# Patient Record
Sex: MALE | Race: WHITE | Employment: FULL TIME | URBAN - METROPOLITAN AREA
[De-identification: names, ages, dates, MRNs, and addresses within clinical notes are randomized per-mention and may not be internally consistent; named-entity substitution may affect disease eponyms.]

---

## 2017-03-09 ENCOUNTER — HOSPITAL ENCOUNTER (OUTPATIENT)
Dept: BEHAVIORAL HEALTH | Facility: CLINIC | Age: 37
Discharge: HOME | End: 2017-03-09
Attending: SOCIAL WORKER | Admitting: SOCIAL WORKER
Payer: COMMERCIAL

## 2017-03-09 VITALS
HEART RATE: 80 BPM | DIASTOLIC BLOOD PRESSURE: 70 MMHG | WEIGHT: 141.4 LBS | BODY MASS INDEX: 22.19 KG/M2 | SYSTOLIC BLOOD PRESSURE: 115 MMHG | HEIGHT: 67 IN

## 2017-03-09 PROCEDURE — H0001 ALCOHOL AND/OR DRUG ASSESS: HCPCS

## 2017-03-09 ASSESSMENT — ANXIETY QUESTIONNAIRES
6. BECOMING EASILY ANNOYED OR IRRITABLE: SEVERAL DAYS
2. NOT BEING ABLE TO STOP OR CONTROL WORRYING: SEVERAL DAYS
7. FEELING AFRAID AS IF SOMETHING AWFUL MIGHT HAPPEN: SEVERAL DAYS
GAD7 TOTAL SCORE: 7
4. TROUBLE RELAXING: SEVERAL DAYS
5. BEING SO RESTLESS THAT IT IS HARD TO SIT STILL: SEVERAL DAYS
3. WORRYING TOO MUCH ABOUT DIFFERENT THINGS: SEVERAL DAYS
1. FEELING NERVOUS, ANXIOUS, OR ON EDGE: SEVERAL DAYS

## 2017-03-09 ASSESSMENT — PAIN SCALES - GENERAL: PAINLEVEL: NO PAIN (0)
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
## 2017-03-10 ASSESSMENT — ANXIETY QUESTIONNAIRES: GAD7 TOTAL SCORE: 7

## 2017-03-10 ASSESSMENT — PATIENT HEALTH QUESTIONNAIRE - PHQ9: SUM OF ALL RESPONSES TO PHQ QUESTIONS 1-9: 11

## 2017-03-10 NOTE — PROGRESS NOTES
[facility]  [address] [place], [place] [address]., [place] [address]      3/10/2017      Nitin Derrick Baker  [address], [address]  [facility] [address]      Dear Mr. Baker,    It was a pleasure meeting with you on 3/9/2017 for your Chemical Dependency Evaluation. Based on your evaluation, the recommendation is:  1)  Complete a residential based or similar treatment program, such as [facility].   2)  Abstain from all mood-altering chemicals unless prescribed by a licensed provider.   3)  Attend, at minimum, 2 weekly 12-step support group meetings.     4)  Actively work with a male sponsor on a weekly basis.   5)  Follow all the recommendations of your treatment/medical providers.  6)  Remain law abiding and follow all recommendations of the Courts/PO.  7)  To come off percocet, seek out either detox or a suboxone provider.    To recap what we discussed briefly on the phone today.  A)  To find a suboxone provider, please contact your insurance, [facility] [place] [facility] ([phone]), or [facility] ([phone]).  B)  To learn more about FMLA, please contact your HR department.  C)  [facility] will most likely contact you, if they do not, please contact them at [phone] and ask for Evelyn.    If I can be of further service, please don't hesitate to call.    Sincerely,        Kassie Teresa MA [facility]  CD Evaluation Counselor  [phone]    (emailed to pt at his request on 3/10/2017)
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
## 2017-03-30 ENCOUNTER — HOSPITAL ENCOUNTER (INPATIENT)
Facility: CLINIC | Age: 37
LOS: 4 days | Discharge: HOME | DRG: 897 | End: 2017-04-03
Attending: FAMILY MEDICINE | Admitting: PSYCHIATRY & NEUROLOGY
Payer: COMMERCIAL

## 2017-03-30 ENCOUNTER — TELEPHONE (OUTPATIENT)
Dept: BEHAVIORAL HEALTH | Facility: CLINIC | Age: 37
End: 2017-03-30

## 2017-03-30 DIAGNOSIS — F11.20 UNCOMPLICATED OPIOID DEPENDENCE (H): ICD-10-CM

## 2017-03-30 PROBLEM — F11.93 OPIOID WITHDRAWAL (H): Status: ACTIVE | Noted: 2017-03-30

## 2017-03-30 LAB
ALBUMIN SERPL-MCNC: 3.7 G/DL (ref 3.4–5)
ALP SERPL-CCNC: 86 U/L (ref 40–150)
ALT SERPL W P-5'-P-CCNC: 15 U/L (ref 0–70)
AMPHETAMINES UR QL SCN: NORMAL
ANION GAP SERPL CALCULATED.3IONS-SCNC: 9 MMOL/L (ref 3–14)
APAP SERPL-MCNC: NORMAL MG/L (ref 10–20)
AST SERPL W P-5'-P-CCNC: 14 U/L (ref 0–45)
BARBITURATES UR QL: NORMAL
BASOPHILS # BLD AUTO: 0 10E9/L (ref 0–0.2)
BASOPHILS NFR BLD AUTO: 0.4 %
BENZODIAZ UR QL: NORMAL
BILIRUB SERPL-MCNC: 0.5 MG/DL (ref 0.2–1.3)
BUN SERPL-MCNC: 13 MG/DL (ref 7–30)
CALCIUM SERPL-MCNC: 8.6 MG/DL (ref 8.5–10.1)
CANNABINOIDS UR QL SCN: NORMAL
CHLORIDE SERPL-SCNC: 108 MMOL/L (ref 94–109)
CO2 SERPL-SCNC: 25 MMOL/L (ref 20–32)
COCAINE UR QL: NORMAL
CREAT SERPL-MCNC: 0.7 MG/DL (ref 0.66–1.25)
DIFFERENTIAL METHOD BLD: NORMAL
EOSINOPHIL # BLD AUTO: 0.1 10E9/L (ref 0–0.7)
EOSINOPHIL NFR BLD AUTO: 0.7 %
ERYTHROCYTE [DISTWIDTH] IN BLOOD BY AUTOMATED COUNT: 12.4 % (ref 10–15)
ETHANOL UR QL SCN: NORMAL
GFR SERPL CREATININE-BSD FRML MDRD: ABNORMAL ML/MIN/1.7M2
GLUCOSE SERPL-MCNC: 147 MG/DL (ref 70–99)
HCT VFR BLD AUTO: 42.4 % (ref 40–53)
HGB BLD-MCNC: 14.3 G/DL (ref 13.3–17.7)
IMM GRANULOCYTES # BLD: 0 10E9/L (ref 0–0.4)
IMM GRANULOCYTES NFR BLD: 0.1 %
LYMPHOCYTES # BLD AUTO: 1.4 10E9/L (ref 0.8–5.3)
LYMPHOCYTES NFR BLD AUTO: 19.9 %
MCH RBC QN AUTO: 29.9 PG (ref 26.5–33)
MCHC RBC AUTO-ENTMCNC: 33.7 G/DL (ref 31.5–36.5)
MCV RBC AUTO: 89 FL (ref 78–100)
MONOCYTES # BLD AUTO: 0.3 10E9/L (ref 0–1.3)
MONOCYTES NFR BLD AUTO: 4.5 %
NEUTROPHILS # BLD AUTO: 5.2 10E9/L (ref 1.6–8.3)
NEUTROPHILS NFR BLD AUTO: 74.4 %
NRBC # BLD AUTO: 0 10*3/UL
NRBC BLD AUTO-RTO: 0 /100
OPIATES UR QL SCN: NORMAL
PLATELET # BLD AUTO: 209 10E9/L (ref 150–450)
POTASSIUM SERPL-SCNC: 3.4 MMOL/L (ref 3.4–5.3)
PROT SERPL-MCNC: 7 G/DL (ref 6.8–8.8)
RBC # BLD AUTO: 4.78 10E12/L (ref 4.4–5.9)
SODIUM SERPL-SCNC: 142 MMOL/L (ref 133–144)
WBC # BLD AUTO: 6.9 10E9/L (ref 4–11)

## 2017-03-30 PROCEDURE — 99283 EMERGENCY DEPT VISIT LOW MDM: CPT | Performed by: FAMILY MEDICINE

## 2017-03-30 PROCEDURE — 25000132 ZZH RX MED GY IP 250 OP 250 PS 637: Performed by: NURSE PRACTITIONER

## 2017-03-30 PROCEDURE — 80329 ANALGESICS NON-OPIOID 1 OR 2: CPT | Performed by: FAMILY MEDICINE

## 2017-03-30 PROCEDURE — 85025 COMPLETE CBC W/AUTO DIFF WBC: CPT | Performed by: FAMILY MEDICINE

## 2017-03-30 PROCEDURE — HZ2ZZZZ DETOXIFICATION SERVICES FOR SUBSTANCE ABUSE TREATMENT: ICD-10-PCS | Performed by: PSYCHIATRY & NEUROLOGY

## 2017-03-30 PROCEDURE — 80320 DRUG SCREEN QUANTALCOHOLS: CPT | Performed by: FAMILY MEDICINE

## 2017-03-30 PROCEDURE — 80053 COMPREHEN METABOLIC PANEL: CPT | Performed by: FAMILY MEDICINE

## 2017-03-30 PROCEDURE — 99284 EMERGENCY DEPT VISIT MOD MDM: CPT | Mod: Z6 | Performed by: FAMILY MEDICINE

## 2017-03-30 PROCEDURE — 80307 DRUG TEST PRSMV CHEM ANLYZR: CPT | Performed by: FAMILY MEDICINE

## 2017-03-30 PROCEDURE — 12800008 ZZH R&B CD ADULT

## 2017-03-30 RX ORDER — ONDANSETRON 4 MG/1
4 TABLET, ORALLY DISINTEGRATING ORAL EVERY 6 HOURS PRN
Status: DISCONTINUED | OUTPATIENT
Start: 2017-03-30 | End: 2017-04-03 | Stop reason: HOSPADM

## 2017-03-30 RX ORDER — HYDROXYZINE HYDROCHLORIDE 25 MG/1
25-50 TABLET, FILM COATED ORAL EVERY 4 HOURS PRN
Status: DISCONTINUED | OUTPATIENT
Start: 2017-03-30 | End: 2017-04-03 | Stop reason: HOSPADM

## 2017-03-30 RX ORDER — IBUPROFEN 600 MG/1
600 TABLET, FILM COATED ORAL
Status: ON HOLD | COMMUNITY
Start: 2016-12-18 | End: 2017-04-03

## 2017-03-30 RX ORDER — BISACODYL 10 MG
10 SUPPOSITORY, RECTAL RECTAL DAILY PRN
Status: DISCONTINUED | OUTPATIENT
Start: 2017-03-30 | End: 2017-04-03 | Stop reason: HOSPADM

## 2017-03-30 RX ORDER — ALUMINA, MAGNESIA, AND SIMETHICONE 2400; 2400; 240 MG/30ML; MG/30ML; MG/30ML
30 SUSPENSION ORAL EVERY 4 HOURS PRN
Status: DISCONTINUED | OUTPATIENT
Start: 2017-03-30 | End: 2017-04-03 | Stop reason: HOSPADM

## 2017-03-30 RX ORDER — BUPRENORPHINE 2 MG/1
4 TABLET SUBLINGUAL
Status: DISCONTINUED | OUTPATIENT
Start: 2017-03-30 | End: 2017-03-31 | Stop reason: ALTCHOICE

## 2017-03-30 RX ORDER — LOPERAMIDE HCL 2 MG
2 CAPSULE ORAL 4 TIMES DAILY PRN
Status: DISCONTINUED | OUTPATIENT
Start: 2017-03-30 | End: 2017-04-03 | Stop reason: HOSPADM

## 2017-03-30 RX ORDER — HYDROXYZINE HYDROCHLORIDE 25 MG/1
25-50 TABLET, FILM COATED ORAL EVERY 4 HOURS PRN
Status: DISCONTINUED | OUTPATIENT
Start: 2017-03-30 | End: 2017-03-31

## 2017-03-30 RX ORDER — IBUPROFEN 600 MG/1
600 TABLET, FILM COATED ORAL EVERY 6 HOURS PRN
Status: DISCONTINUED | OUTPATIENT
Start: 2017-03-30 | End: 2017-04-03 | Stop reason: HOSPADM

## 2017-03-30 RX ORDER — TRAZODONE HYDROCHLORIDE 50 MG/1
50 TABLET, FILM COATED ORAL
Status: DISCONTINUED | OUTPATIENT
Start: 2017-03-30 | End: 2017-04-03 | Stop reason: HOSPADM

## 2017-03-30 RX ADMIN — TRAZODONE HYDROCHLORIDE 50 MG: 50 TABLET ORAL at 22:59

## 2017-03-30 RX ADMIN — ALUMINUM HYDROXIDE, MAGNESIUM HYDROXIDE, AND DIMETHICONE 30 ML: 400; 400; 40 SUSPENSION ORAL at 23:00

## 2017-03-30 ASSESSMENT — ACTIVITIES OF DAILY LIVING (ADL)
ORAL_HYGIENE: INDEPENDENT
GROOMING: INDEPENDENT
DRESS: INDEPENDENT

## 2017-03-30 NOTE — IP AVS SNAPSHOT
MRN:[id number]                      After Visit Summary   3/30/2017    Nitin Baker    MRN: [id number]           Thank you!     Thank you for choosing [facility] for your care. Our goal is always to provide you with excellent care.        Patient Information     Date Of Birth          1980        About your hospital stay     You were admitted on:  March 30, 2017 You last received care in the:  [facility]    You were discharged on:  April 3, 2017       Who to Call     For medical emergencies, please call 911.  For non-urgent questions about your medical care, please call your primary care provider or clinic, None          Attending Provider     Provider Specialty    Chacho Stevens MD [facility]    Renaldo, Tanya Blanchard MD Pediatrics    Miladis, Jesus Lugo MD [facility]       Primary Care Provider    Physician No Ref-Primary       No address on file        Further instructions from your care team       Behavioral Discharge Planning and Instructions  THANK YOU FOR CHOOSING THE [address]  [phone]    Summary: You were admitted to [facility] on 3/30/17 for detoxification from Opiate. A medical exam was performed that included lab work. You have met with a  and opted to discharge home awaiting admission to [facility] or [facility]. Please call the [facility] and/or [facility] Admissions Specialist each morning at [phone] and [phone] to check on admission status.  Please take care and make your recovery a priority.    [facility]  [address] [place] [address] [phone]    [address] [address] [phone]    Main Diagnosis:  Per Dr. Macario/Miladis  Opioid dependence with withdrawal   Active Problems:  Opioid use disorder, severe, dependence    History of traumatic brain injury     Major  Treatments, Procedures and Findings:  You received treatment for Opiate withdrawal.  You have met with a  to develop a treatment plan for discharge.  You have had labs drawn and a copy of those labs will be sent home with you.  Please bring your lab results with you to your primary follow up appointment. Make your recovery a priority!    Symptoms to Report:  If you experience more anxiety, confusion, sleeplessness, deep sadness or thoughts of suicide, notify your treatment team or notify your primary care physician. IF ANY OF THE SYMPTOMS YOU ARE EXPERIENCING ARE A MEDICAL EMERGENCY CALL 911 IMMEDIATELY.     Lifestyle Adjustment: Adjust your lifestyle to get enough sleep, relaxation, exercise and  good nutrition. Continue to develop healthy coping skills to decrease stress and promote a sober living environment. Do not use alcohol, illegal drugs or addictive medications other than what is currently prescribed. JUANJOSE GALLEGOS, and  Sponsor are excellent resources for support.     Primary Provider:   [facility]  [address] [place] [address]  # [phone]    Appointments:  5/5/17 at 11:00am     Resources:     [facility] [phone]  Support Group:  AA/NA and Sponsor/support  Crisis Intervention: 246.940.9548 or 295-783-7627 (TTY: 815.223.1704).  Call anytime for help.  National [place] on Mental Illness (www.[place].katy.org): 230.852.4036 or 610-604-1250.  Alcoholics Anonymous (www.alcoholics-anonymous.org): Check your phone book for your local chapter.  Suicide Awareness Voices of Education (SAVE) (www.save.org): 642-085-VONW (1675)  National Suicide Prevention Line (www.mentalhealthmn.org): 925-930-QFIF (1785)  Mental Health Consumer/Survivor Network of [place] (www.mhcsn.net): 721.596.4445 or 883-168-6670  Mental Health Association of [place] (www.mentalhealth.org): 638.413.6106 or 225-745-0135   Substance Abuse and Mental Health Services (www.samhsa.gov)  Narcotics  Anonymous [place]: www.[facility].org  [place] Help line  0-898-232-9018   [facility] ([facility])  [facility] connects people seeking recovery to resources that help foster and sustain long-term recovery.  Whether you are seeking resources for treatment, transportation, housing, job training, education, health care or other pathways to recovery, [facility] is a great place to start.  604.917.2531.  Www.[facility].org      -There has been an increase in opioid overdose and deaths recently.  After even a short period of no drug use a person's intolerance level is lowered.  It is not safe to think a person can use the same amount of drugs as they did prior to their period of no drug use.   Returning to your drug using environment and contact with your drug using friends puts you at high risk for relapse.    Www.harmreduction.org        General Medication Instructions:   See your medication sheet(s) for instructions.   Take all medicines as directed.  Make no changes unless your doctor suggests them.   Go to all your doctor visits.  Be sure to have all your required lab tests. This way, your medicines can be refilled on time.  Do not use any drugs not prescribed by your provider.  AA/NA and Sponsors are excellent resources for support  Avoid alcohol.      Please Note:  If you have any questions at anytime after you are discharged please call the [facility] detox unit 3AW unit at [phone].    [facility], Behavioral Intake [phone]    Please take this discharge folder with you to all your follow up appointments, it contains your lab results, diagnosis, medication list and discharge recommendations.      THANK YOU FOR CHOOSING THE [facility]     Pending Results     No orders found from 3/28/2017 to 3/31/2017.            Statement of Approval     Ordered          04/03/17 0937  I have reviewed and agree with  "all the recommendations and orders detailed in this document.  EFFECTIVE NOW     Approved and electronically signed by:  Jesus Redding MD           17 0935  I have reviewed and agree with all the recommendations and orders detailed in this document.  EFFECTIVE NOW     Approved and electronically signed by:  Jesus Redding MD             Admission Information     Date & Time Provider Department Dept. Phone    3/30/2017 Jesus Redding MD [facility] [phone]      Your Vitals Were     Blood Pressure Pulse Temperature Respirations Height Weight    107/70 (BP Location: Left arm) 57 97.3  F (36.3  C) (Oral) 16 1.702 m (5' 7\") 62.6 kg (138 lb)    Pulse Oximetry BMI (Body Mass Index)                97% 21.61 kg/m2          MyChart Information     [facility] lets you send messages to your doctor, view your test results, renew your prescriptions, schedule appointments and more. To sign up, go to [email or web address] . Click on \"Log in\" on the left side of the screen, which will take you to the Welcome page. Then click on \"Sign up Now\" on the right side of the page.     You will be asked to enter the access code listed below, as well as some personal information. Please follow the directions to create your username and password.     Your access code is: [email or web address]  Expires: 2017 12:18 PM     Your access code will  in 90 days. If you need help or a new code, please call your [place] clinic or [phone].        Care EveryWhere ID     This is your Care EveryWhere ID. This could be used by other organizations to access your [place] medical records  [id number]           Review of your medicines      START taking        Dose / Directions    gabapentin 300 MG capsule   Commonly known as:  NEURONTIN   Used for:  Uncomplicated opioid dependence (H)        Dose:  300 mg   Take 1 capsule (300 mg) by mouth 3 times daily   Quantity:  90 capsule   Refills:  0       " traZODone 50 MG tablet   Commonly known as:  DESYREL   Used for:  Uncomplicated opioid dependence (H)        Dose:  50 mg   Take 1 tablet (50 mg) by mouth nightly as needed for sleep   Quantity:  30 tablet   Refills:  0         STOP taking     ibuprofen 600 MG tablet   Commonly known as:  ADVIL/MOTRIN                Where to get your medicines      These medications were sent to [facility] [place], [place] - [address]  [address] [facility], [facility] [address]     Phone:  [phone]     gabapentin 300 MG capsule    traZODone 50 MG tablet                Protect others around you: Learn how to safely use, store and throw away your medicines at www.disposemymeds.org.             Medication List: This is a list of all your medications and when to take them. Check marks below indicate your daily home schedule. Keep this list as a reference.      Medications           Morning Afternoon Evening Bedtime As Needed    gabapentin 300 MG capsule   Commonly known as:  NEURONTIN   Take 1 capsule (300 mg) by mouth 3 times daily   Last time this was given:  300 mg on 4/3/2017  8:44 AM                                         traZODone 50 MG tablet   Commonly known as:  DESYREL   Take 1 tablet (50 mg) by mouth nightly as needed for sleep   Last time this was given:  50 mg on 4/2/2017  9:37 PM

## 2017-03-30 NOTE — PROGRESS NOTES
03/30/17 1658   Patient Belongings   Did you bring any home meds/supplements to the hospital?  Yes   Disposition of meds  Sent to security/pharmacy per site process   Disposition of Belongings storage   Belongings Search Yes   Clothing Search Yes   Second Staff Davin       STORAGE BIN:   coat, cap, shoes, sweatshirt, belt, keys, cigarettes, lighters, nail clippers, bag of papers    LOCKED DRAWER 320-2:   cell phone, wallet    SECURITY:   $100.00, 2 [place] id, 2 visa, ins., loose meds, pill cutters.    ADMISSION:  I am responsible for any personal items that are not sent to the safe or pharmacy. [facility] is not responsible for loss, theft or damage of any property in my possession.  Patient Signature _____________________ Date/Time _____________________  Staff Signature _______________________ Date/Time _____________________  2nd Staff person, if patient is unable/unwilling to sign  ___________________________________ Date/Time _____________________  DISCHARGE:  All personal items have been returned to me.  Patient Signature _____________________ Date/Time _____________________  Staff Signature _______________________ Date/Time _____________________

## 2017-03-30 NOTE — PHARMACY-ADMISSION MEDICATION HISTORY
Admission Medication History status for the 3/30/2017 admission is complete.  See EPIC admission navigator for Prior to Admission medications.    Medication history interview sources:  Patient     Medication history source reliability: Good    Patient reports taking no prescription or over-the-counter medications.    Time spent in this activity: 5 minutes    Medication history completed by: Wendy King PharmD    Prior to Admission medications    Not on File

## 2017-03-30 NOTE — IP AVS SNAPSHOT
[facility]    [address] [place] [address]    Phone:  [phone]                                       After Visit Summary   3/30/2017    Nitin Baker    MRN: [id number]           After Visit Summary Signature Page     I have received my discharge instructions, and my questions have been answered. I have discussed any challenges I see with this plan with the nurse or doctor.    ..........................................................................................................................................  Patient/Patient Representative Signature      ..........................................................................................................................................  Patient Representative Print Name and Relationship to Patient    ..................................................               ................................................  Date                                            Time    ..........................................................................................................................................  Reviewed by Signature/Title    ...................................................              ..............................................  Date                                                            Time

## 2017-03-30 NOTE — ED PROVIDER NOTES
History     Chief Complaint   Patient presents with     Addiction Problem     Pt here for detox from opiates     HPI  Nitin Baker is a 36 year old male who presents seeking detox and opiates.  He takes between 5 and 15 tablets of Percocet on a daily basis.  He states he believes most days he takes 5 or 6 tablets.  He has occasionally taken Vicodin or other prescription opiates, but in the recent past only Percocet.  He did use cocaine last weekend but denies using this habitually.  Denies any IV drugs, denies any other drugs of abuse.  He denies any acute medical symptoms and he denies any acute psychiatric symptoms.    I have reviewed the Medications, Allergies, Past Medical and Surgical History, and Social History in the Epic system.    Review of Systems  All other systems reviewed and were negative    Physical Exam   BP: 112/74  Pulse: 57  Temp: 97.8  F (36.6  C)  Resp: 16  Weight: 62.6 kg (138 lb)  SpO2: 97 %  Physical Exam   Constitutional: He is oriented to person, place, and time. He appears well-developed and well-nourished.   HENT:   Head: Normocephalic and atraumatic.   Mouth/Throat: Oropharynx is clear and moist.   Eyes: EOM are normal. Pupils are equal, round, and reactive to light.   Neck: Normal range of motion. Neck supple. No tracheal deviation present. No thyromegaly present.   Cardiovascular: Normal rate, regular rhythm, normal heart sounds and intact distal pulses.  Exam reveals no gallop and no friction rub.    No murmur heard.  Pulmonary/Chest: Effort normal and breath sounds normal. He exhibits no tenderness.   Abdominal: Soft. Bowel sounds are normal. He exhibits no distension and no mass. There is no hepatosplenomegaly. There is no tenderness.   Musculoskeletal: He exhibits no edema or tenderness.   Neurological: He is alert and oriented to person, place, and time. No cranial nerve deficit. Coordination normal.   Skin: Skin is warm and dry. No rash noted.   Psychiatric: He has a  normal mood and affect. His behavior is normal.   Nursing note and vitals reviewed.      ED Course     ED Course     Procedures             Critical Care time:  none               Labs Ordered and Resulted from Time of ED Arrival Up to the Time of Departure from the ED   DRUG ABUSE SCREEN 6 CHEM DEP URINE ([facility])   CBC WITH PLATELETS DIFFERENTIAL   ACETAMINOPHEN LEVEL   COMPREHENSIVE METABOLIC PANEL       Assessments & Plan (with Medical Decision Making)   Patient is here seeking detox from opiates.  He is abusing mainly prescription opiates.  He does develop both physical and psychological opiate withdrawal symptoms if he does not use, and has been unable to stop using in community without assistance.  He denies other acute medical or psychiatric problems.  Because of the acetaminophen in Percocet I will obtain labs, however I have a very low suspicion of any significant abnormalities or significant chronic acetaminophen toxicity, as his physical exam and vital signs are normal.  He is medically cleared for voluntary detox admission.    I have reviewed the nursing notes.    I have reviewed the findings, diagnosis, plan and need for follow up with the patient.    New Prescriptions    No medications on file       Final diagnoses:   Uncomplicated opioid dependence (H)       3/30/2017   [facility], [place], EMERGENCY DEPARTMENT     Chacho Stevens MD  03/30/17 3046

## 2017-03-30 NOTE — TELEPHONE ENCOUNTER
S: Pt called seeking detox from percocet.  B: pt reports he has been taking percocet (not rx'ed), 2-5 pills per day, for past 4-5 years. Hx of cd tx for etoh and marij use 10 years ago. He denies using other chemicals currently.  A: pt requests detox.  R: bed held on 3a until 1:15 pm. Author explained no guar of admit and explained the er process. Pt to be eval'ed in er. mbw  S: Dr Dodd gave clinical saying pt is in er requesting detox.  B: Pt says he has been taking  5-15 percocet pills per day for the past 4-5 years. He admits to using cocaine occasionally, last use last weekend. Utox pending. No chronic med prob's.   A: denies SI, med cleared, coop, vol  R: #3awest/straight michelle/baldo accepted for herself                        kori

## 2017-03-31 PROBLEM — Z87.820 H/O TRAUMATIC BRAIN INJURY: Chronic | Status: ACTIVE | Noted: 2017-03-31

## 2017-03-31 PROBLEM — F11.20 OPIOID USE DISORDER, SEVERE, DEPENDENCE (H): Chronic | Status: ACTIVE | Noted: 2017-03-31

## 2017-03-31 PROBLEM — Z87.820 H/O TRAUMATIC BRAIN INJURY: Status: ACTIVE | Noted: 2017-03-31

## 2017-03-31 PROBLEM — F11.23 OPIOID DEPENDENCE WITH WITHDRAWAL (H): Status: ACTIVE | Noted: 2017-03-30

## 2017-03-31 PROBLEM — F11.20 OPIOID USE DISORDER, SEVERE, DEPENDENCE (H): Status: ACTIVE | Noted: 2017-03-31

## 2017-03-31 PROCEDURE — 25000132 ZZH RX MED GY IP 250 OP 250 PS 637: Performed by: PSYCHIATRY & NEUROLOGY

## 2017-03-31 PROCEDURE — 25000132 ZZH RX MED GY IP 250 OP 250 PS 637: Performed by: NURSE PRACTITIONER

## 2017-03-31 PROCEDURE — 99223 1ST HOSP IP/OBS HIGH 75: CPT | Mod: AI | Performed by: PSYCHIATRY & NEUROLOGY

## 2017-03-31 PROCEDURE — 12800008 ZZH R&B CD ADULT

## 2017-03-31 RX ORDER — GABAPENTIN 300 MG/1
300 CAPSULE ORAL 3 TIMES DAILY
Status: DISCONTINUED | OUTPATIENT
Start: 2017-03-31 | End: 2017-04-03 | Stop reason: HOSPADM

## 2017-03-31 RX ORDER — TIZANIDINE 2 MG/1
2 TABLET ORAL EVERY 8 HOURS PRN
Status: DISCONTINUED | OUTPATIENT
Start: 2017-03-31 | End: 2017-04-03 | Stop reason: HOSPADM

## 2017-03-31 RX ADMIN — GABAPENTIN 300 MG: 300 CAPSULE ORAL at 20:27

## 2017-03-31 RX ADMIN — NICOTINE POLACRILEX 4 MG: 2 GUM, CHEWING ORAL at 12:13

## 2017-03-31 RX ADMIN — TRAZODONE HYDROCHLORIDE 50 MG: 50 TABLET ORAL at 22:15

## 2017-03-31 RX ADMIN — TIZANIDINE 2 MG: 2 TABLET ORAL at 16:21

## 2017-03-31 RX ADMIN — NICOTINE POLACRILEX 4 MG: 2 GUM, CHEWING ORAL at 17:27

## 2017-03-31 RX ADMIN — GABAPENTIN 300 MG: 300 CAPSULE ORAL at 12:13

## 2017-03-31 RX ADMIN — IBUPROFEN 600 MG: 600 TABLET ORAL at 22:15

## 2017-03-31 ASSESSMENT — ACTIVITIES OF DAILY LIVING (ADL)
DRESS: STREET CLOTHES;INDEPENDENT
ORAL_HYGIENE: INDEPENDENT
GROOMING: HANDWASHING;INDEPENDENT
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
## 2017-03-31 NOTE — PROGRESS NOTES
"Pt remains in mild Opiate withdrawal, sleeping comfortable most of the day.  Tolerating non opiate withdrawal well and has not required any PRN medications.  He did get up for lunch, was encouraged to stay up and has been visible in the milieu this afternoon.  Ate 100% of both meals, VSS, denies depression, anxiety 5/10, full range affect and reports he slept well but still feels \"tired.\" He is planning to discharge to [facility] when he is stable and a bed is available.  Will continue to monitor and treat Opiate withdrawal and assist with discharge planning.          "

## 2017-04-01 PROCEDURE — 25000132 ZZH RX MED GY IP 250 OP 250 PS 637: Performed by: NURSE PRACTITIONER

## 2017-04-01 PROCEDURE — 12800008 ZZH R&B CD ADULT

## 2017-04-01 PROCEDURE — 25000132 ZZH RX MED GY IP 250 OP 250 PS 637: Performed by: PSYCHIATRY & NEUROLOGY

## 2017-04-01 RX ADMIN — TRAZODONE HYDROCHLORIDE 50 MG: 50 TABLET ORAL at 23:54

## 2017-04-01 RX ADMIN — GABAPENTIN 300 MG: 300 CAPSULE ORAL at 08:27

## 2017-04-01 RX ADMIN — GABAPENTIN 300 MG: 300 CAPSULE ORAL at 14:24

## 2017-04-01 RX ADMIN — NICOTINE POLACRILEX 4 MG: 2 GUM, CHEWING ORAL at 14:24

## 2017-04-01 RX ADMIN — IBUPROFEN 600 MG: 600 TABLET ORAL at 12:15

## 2017-04-01 RX ADMIN — TRAZODONE HYDROCHLORIDE 50 MG: 50 TABLET ORAL at 21:39

## 2017-04-01 RX ADMIN — TRAZODONE HYDROCHLORIDE 50 MG: 50 TABLET ORAL at 00:08

## 2017-04-01 RX ADMIN — GABAPENTIN 300 MG: 300 CAPSULE ORAL at 20:47

## 2017-04-01 RX ADMIN — NICOTINE POLACRILEX 4 MG: 2 GUM, CHEWING ORAL at 08:27

## 2017-04-01 RX ADMIN — NICOTINE POLACRILEX 4 MG: 2 GUM, CHEWING ORAL at 16:12

## 2017-04-01 ASSESSMENT — ACTIVITIES OF DAILY LIVING (ADL)
DRESS: STREET CLOTHES;INDEPENDENT
GROOMING: HANDWASHING;INDEPENDENT
ORAL_HYGIENE: INDEPENDENT

## 2017-04-01 NOTE — PLAN OF CARE
"Pt here for opiate withdrawal and doing well with non opiate withdrawal protocol, only compliant is being \"tired.\" Full range affect, VSS,  Mood \"good\", denies anxiety, sleep and appetite are \"good.\" Spends most of his time in bed sleeping, but receptive to staff suggests to get up for vitals and meals. He is planning to discharge to [facility] or [facility] when he is stable and a bed is available. Will continue to monitor and treat Opiate withdrawal and assist with discharge planning.            "

## 2017-04-02 PROCEDURE — 12800008 ZZH R&B CD ADULT

## 2017-04-02 PROCEDURE — 25000132 ZZH RX MED GY IP 250 OP 250 PS 637: Performed by: PSYCHIATRY & NEUROLOGY

## 2017-04-02 PROCEDURE — 25000132 ZZH RX MED GY IP 250 OP 250 PS 637: Performed by: NURSE PRACTITIONER

## 2017-04-02 RX ADMIN — GABAPENTIN 300 MG: 300 CAPSULE ORAL at 08:04

## 2017-04-02 RX ADMIN — TRAZODONE HYDROCHLORIDE 50 MG: 50 TABLET ORAL at 21:37

## 2017-04-02 RX ADMIN — IBUPROFEN 600 MG: 600 TABLET ORAL at 20:31

## 2017-04-02 RX ADMIN — GABAPENTIN 300 MG: 300 CAPSULE ORAL at 14:01

## 2017-04-02 RX ADMIN — HYDROXYZINE HYDROCHLORIDE 50 MG: 25 TABLET ORAL at 16:13

## 2017-04-02 RX ADMIN — NICOTINE POLACRILEX 4 MG: 2 GUM, CHEWING ORAL at 12:10

## 2017-04-02 RX ADMIN — TRAZODONE HYDROCHLORIDE 50 MG: 50 TABLET ORAL at 20:31

## 2017-04-02 RX ADMIN — GABAPENTIN 300 MG: 300 CAPSULE ORAL at 20:31

## 2017-04-02 ASSESSMENT — ACTIVITIES OF DAILY LIVING (ADL)
ORAL_HYGIENE: INDEPENDENT
GROOMING: HANDWASHING;INDEPENDENT
DRESS: STREET CLOTHES;INDEPENDENT

## 2017-04-03 VITALS
DIASTOLIC BLOOD PRESSURE: 70 MMHG | HEIGHT: 67 IN | WEIGHT: 138 LBS | SYSTOLIC BLOOD PRESSURE: 107 MMHG | BODY MASS INDEX: 21.66 KG/M2 | RESPIRATION RATE: 16 BRPM | TEMPERATURE: 97.3 F | HEART RATE: 57 BPM | OXYGEN SATURATION: 97 %

## 2017-04-03 PROCEDURE — 25000132 ZZH RX MED GY IP 250 OP 250 PS 637: Performed by: PSYCHIATRY & NEUROLOGY

## 2017-04-03 PROCEDURE — 99238 HOSP IP/OBS DSCHRG MGMT 30/<: CPT | Performed by: FAMILY MEDICINE

## 2017-04-03 RX ORDER — GABAPENTIN 300 MG/1
300 CAPSULE ORAL 3 TIMES DAILY
Qty: 90 CAPSULE | Refills: 0 | Status: SHIPPED | OUTPATIENT
Start: 2017-04-03

## 2017-04-03 RX ORDER — TRAZODONE HYDROCHLORIDE 50 MG/1
50 TABLET, FILM COATED ORAL
Qty: 30 TABLET | Refills: 0 | Status: SHIPPED | OUTPATIENT
Start: 2017-04-03

## 2017-04-03 RX ADMIN — GABAPENTIN 300 MG: 300 CAPSULE ORAL at 08:44

## 2017-04-03 ASSESSMENT — ACTIVITIES OF DAILY LIVING (ADL)
DRESS: STREET CLOTHES;INDEPENDENT
GROOMING: HANDWASHING;INDEPENDENT
ORAL_HYGIENE: INDEPENDENT

## 2017-04-03 NOTE — PROGRESS NOTES
Case Management Note  4/3/2017    Writer met with pt to continue discharge planning. Writer contacted [facility] courts to see if pt could get a continuance on his upcoming court date. Writer informed pt has already requested a continuance which is Wednesday, 4/5/17 at 1:00 pm. Pt asked writer when will he be discharged, as he has tests to take before his court date to have his drivers license re-instated. Pt reports this must be done before Wednesday, 4/5/17. Pt informed his discharge is up to his doctor and nursing staff. Writer contacted [facility] to check on the status of pt's admission to their program. Writer left a voicemail message. Assessment documents faxed to [facility] for intake/admission.    Ugo Sim MA, SHERONC

## 2017-04-03 NOTE — PROGRESS NOTES
"Pt discharged to home with all belongings and medications.  Acknowledged understanding of all discharge instructions. Transported by \"My bicycle locked outside the hospital\" Walked off unit by Candi MORRELL.   "

## 2017-04-04 NOTE — DISCHARGE SUMMARY
DATE OF DISCHARGE:  2017       Edi Baker is a 36-year-old male patient of Dr. Klein.  He is being discharged on 2017.  He has opioid dependence with withdrawal, opioid use disorder.  He has a history of traumatic brain injury.  The plan is for the patient to go to [facility] or [facility].  The patient will be discharged to a safe environment, awaiting placement.  He is medically stable at the time of discharge.      The patient was using Percocet, snorting them daily prior to admission but his last use was 2017.  He did not need opioids for detoxification.  He was not seeking buprenorphine for maintenance during this hospital stay because he planned to go to [facility] and they did not want him on buprenorphine.  He did well during the course of the hospital stay being treated with trazodone and gabapentin and p.r.n. tizanidine.  He was medically stable at the time of discharge.      Laboratory work during the course of the hospital stay showed a normal chem profile and CBC.        DISCHARGE MEDICATIONS:  The patient will be discharged on:     1.  Gabapentin 300 mg t.i.d.   2.  Trazodone 50 mg at bedtime as needed for sleep.      DISCHARGE DIAGNOSES:  Opioid use disorder, severe; opioid withdrawal, history of traumatic brain injury.      Thirty minutes were spent with the patient and record in completion of this discharge summary with over 50% of time spent in counseling and coordination of care.         JACKIE PALENCIA MD             D: 2017 09:40   T: 2017 08:03   MT: [email or web address]      Name:     EDI BAKER   MRN:      [id number]        Account:        [id number]   :      1980           Admit Date:     [id number]                                  Discharge Date: 2017      Document: [id number]

## 2017-05-10 ENCOUNTER — HOSPITAL ENCOUNTER (OUTPATIENT)
Dept: BEHAVIORAL HEALTH | Facility: CLINIC | Age: 37
Discharge: HOME | End: 2017-05-10
Attending: SOCIAL WORKER | Admitting: SOCIAL WORKER
Payer: COMMERCIAL

## 2017-05-10 VITALS
WEIGHT: 138 LBS | HEIGHT: 65 IN | HEART RATE: 58 BPM | BODY MASS INDEX: 22.99 KG/M2 | SYSTOLIC BLOOD PRESSURE: 121 MMHG | DIASTOLIC BLOOD PRESSURE: 75 MMHG

## 2017-05-10 PROCEDURE — H0001 ALCOHOL AND/OR DRUG ASSESS: HCPCS

## 2017-05-10 ASSESSMENT — ANXIETY QUESTIONNAIRES
3. WORRYING TOO MUCH ABOUT DIFFERENT THINGS: NOT AT ALL
6. BECOMING EASILY ANNOYED OR IRRITABLE: SEVERAL DAYS
5. BEING SO RESTLESS THAT IT IS HARD TO SIT STILL: SEVERAL DAYS
GAD7 TOTAL SCORE: 4
7. FEELING AFRAID AS IF SOMETHING AWFUL MIGHT HAPPEN: SEVERAL DAYS
4. TROUBLE RELAXING: SEVERAL DAYS
2. NOT BEING ABLE TO STOP OR CONTROL WORRYING: NOT AT ALL
1. FEELING NERVOUS, ANXIOUS, OR ON EDGE: NOT AT ALL

## 2017-05-10 ASSESSMENT — PAIN SCALES - GENERAL: PAINLEVEL: MILD PAIN (2)

## 2017-05-10 NOTE — PROGRESS NOTES
"[facility]  [address] [place] [id number]               ADULT CD ASSESSMENT      Additional Clinical Questions - Outpatient    Patient Name: Nitin Baker  Cell Phone:  [phone]    Email:  [email or web address]  Emergency Contact: Kasey Baker (mom)  Tel: [phone]    ________________________________________________________________________      The patient is  Single, in a serious relationship    With which race do you identify? White    Please list your family members and if they are living or , i.e. (grandparents, parents, step-parents, adoptive parents, number of siblings, half-siblings, etc.)     Mother   Living Father    No Step-mother   NA No Step-father NA   Maternal Grandmother    Fraternal Grandmother    Maternal Grandfather     Fraternal Grandfather    No Sister(s) NA No Brother(s)   NA   No Half-sister(s)   NA No Half-brother(s) NA             Who raised you? (parents, grandparents, adoptive parents, step-parents, etc.)    Mother  Grandmother    Have any of your family members or significant others had problems with mental illness or substance abuse?  Please explain.    none    Do you have any children or Stepchildren? Yes, please explain: 2 children, 5 and 6 years old    Are you being investigated by Child Protection Services? No    Do you have a child protection worker, probation office or ? Yes, please explain: He is on probation in [facility].    How would you describe your current finances?  In serious debt    If you are having problems, (unpaid bills, bankruptcy, IRS problems) please explain:  Yes, please explain: He owes child support and \"car stuff.\"    If working or a student are you able to function appropriately in that setting? Yes    Describe your preferred learning style:  by hands-on practice and by watching someone else demonstrate    What personal strengths do you have " "that can help you get sober?  \"I am a good listener, quick learner most of the time. As long as I am interested in something.\"    Do you currently self-administer your medications?  Yes    Have you ever:    Had to lie to people important to you about how much you maxwell?     No     Felt the need to bet more and more money?      No     Attempted treatment for a gambling problem?        No     Touched or fondled someone else inappropriately, or forced them to have sex with you against their will?       No     Are you or have you ever been a registered sex offender?        No     Is there any history of sexual abuse in your family?        No     [place] obsessed by your sexual behavior (having sex with many partners, masturbating often, using pornography often?        No     Received therapy or stayed in the hospital for mental health problems?        No     Hurt yourself (cutting, burning or hitting yourself)?        No     Purged, binged or restricted yourself as a way to control your weight?      No       Are you on a special diet?       No       Do you have any concerns regarding your nutritional status?        No       Have you had any appetite changes in the last 3 months?        No       Have you had any weight loss or weight gain in the last 3 months?  If yes, how much gain or loss:     If weight patient gains more than 10 lbs or loses more than 10 lbs, refer to program RN /  Attending Physician for assessment.    No        Was the patient informed of BMI?      Normal, No Intervention   Yes     Do you have any dental problems?        No     Lived through any trauma or stressful events?        Yes, If yes explain: \"mom's crazy old boyfriend.\"     In the past month, have you had any of the following symptoms related to the trauma listed above? (Dreams, intense memories, flashbacks, physical reactions, etc.)         No     Believed that people are spying on you, or that someone was plotting against you or trying to " hurt you?       No     Believed that someone was reading your mind or could hear your thoughts or that you could actually read someone's mind, hear what another person was thinking?       No     Believed that someone or some force outside of yourself put thoughts in your mind that were not your own, or made you act in a way that was not your usual self?  Or have you ever thought you were possessed?         No     Believed that you were being sent special messages through the TV, radio or newspaper?         No     [place] things other people couldn't hear, such as voices?         No     Had visions when you were awake?  Or have you ever seen things other people couldn't see?       No         Suicide Screening Questions:    1. Are you feeling hopeless about the present/future?   No   2. Have you ever had thoughts about taking your life?   No   3. When did you have these thoughts? NA   4. Do you have any current intent or active desire to take your life?   No   5. Do you have a plan to take your life?    No   6. Have you ever made a suicide attempt?   No   7. Do you have access to pills, guns or other methods to kill yourself?   No       Risk Status - Use as Guide/Example    Ideation - Active  Thoughts of suicide Intent to follow  Through on suicide Plan for completing  suicide    Yes No Yes No Yes No   Emergent X  X  X    Urgent / Non-Emergent X  X   X   Non-Urgent X   X  X   No Current / Active Risk (Past 6 Months)  X  X  X   Nitin Baker No No No       Additional Risk Factors: Financial stress of not having enough money to pay bills or go to treatment.   Protective Factors:  Having people in his/her life that would prevent the patient from considering committing suicide (i.e. young children, spouse, parents, etc.)  An absence of mental health issues or stable and well treated mental health issues  An absence of chronic health problems or stable and well treated chronic health issues  Having restricted access  "to highly lethal means of suicide     Risk Status:    Emergent? No  Urgent / Non-Emergent?  No  Present / Non- Urgent? No      No Current Risk? Yes, Evaluation Counselors - Document in Epic / SBAR to counselor \"No identified risk\" and Treatment Counselors - Assess weekly in progress notes under Dimension [facility] and summarize in Discharge / Treatment summary under Dim[facility].    Additional information to support suicide risk rating: See Above    Mental Status Assessment    Physical Appearance/Attire:  Appears stated age  Hygiene:  well groomed  Eye Contact:  at examiner  Speech:  regular  Speech Volume:  regular  Speech Quality: fluid  Cognitive/Perceptual:  reality based  Cognition:  memory intact   Judgment:  intact  Insight:  intact  Orientation:  time, place, person and situation  Thought:  logical   Hallucinations:  none  General Behavioral Tone:  cooperative  Psychomotor Activity:  no problem noted  Gait:  no problem  Mood:  appropriate  Affect:  congruence/appropriate    Counselor Notes: NA    Criteria for Diagnosis  DSM-5 Criteria for Substance Abuse    304.00 (F11.20) Opioid Use Disorder Severe  304.20 (F14.20) Cocaine Use Disorder Moderate  305.10 (F17.200)  Tobacco Use Disorder Moderate    LEVEL OF CARE    Intoxication and Withdrawal: 0  Biomedical:  0  Emotional and Behavioral:  2  Readiness to Change:  1  Relapse Potential: 4  Recovery Environmental:  2    Initial problem list:    The patient lacks relapse prevention skills  The patient has poor coping skills  The patient has poor refusal skills   The patient lacks a sober peer support network  The patient has a tendency to isolate  The patient has current legal issues    Patient/Client is willing to follow treatment recommendations.  Yes    Kassie Bacon [facility]       Vulnerable Adult Checklist for OUTPATIENTS     1.  Do you have a physical, emotional or mental infirmity or dysfunction?       Yes (explain) - TBI    2.  Does this issue impair your " ability to provide for your own care without help, including providing yourself with food, shelter, clothing, healthcare or supervision?       No    3.  Because of this issue, I need assistance to protect myself from maltreatment by others.      No    Based on the above information:    This person is not a functional Vulnerable Adult according to [place] Statute 626.5572 subdivision 21.

## 2017-05-10 NOTE — PROGRESS NOTES
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 lives with you? No    6A. Do you have children who live with you?     No    6B. Do you have children who do not live with you?      2 children: 5 and 6 years old. They live with his girlfriend's mother (their maternal grandmother).  He stated they are safe there.    7A. Who supports you in making changes in your alcohol or drug use? What are they willing to do to support you? Who is upset or angry about you making changes in your alcohol or drug use? How big a problem is this for you?      \"Everybody that I know basically.\"    7B. This table is provided to record information about the person s relationships and available support It is not necessary to ask each item; only to get a comprehensive picture of their support system.  How often can you count on the following people when you need someone?   Partner / Spouse Never supportive   Parent(s)/Aunt(s)/Uncle(s)/Grandparents Usually supportive   Sibling(s)/Cousin(s) Rarely supportive   Child(jose raul) N/A   Other relative(s) N/A   Friend(s)/neighbor(s) Always supportive   Child(jose raul) s father(s)/mother(s) N/A   Support group member(s) N/A   Community of katya members N/A   /counselor/therapist/healer N/A   Other (specify) Yes Always supportive (co-workers)     8A. What is your current living " situation?     He lives between his mother's house and his friend's house.    8B. What is your long term plan for where you will be living?     No plan.    8C. Tell me about your living environment/neighborhood? Ask enough follow up questions to determine safety, criminal activity, availability of alcohol and drugs, supportive or antagonistic to the person making changes.      No concerns.    9. Criminal justice history: Gather current/recent history and any significant history related to substance use--Arrests? Convictions? Circumstances? Alcohol or drug involvement? Sentences? Still on probation or parole? Expectations of the court? Current court order? Any sex offenses - lifetime? What level? (DSM)    DWI from many years ago.  DWI from 2016- on probation in [facility]  Domestic Assault charge- about 3-5 years ago and is on probation in [facility]. He violated his no contact order.    10. What obstacles exist to participating in treatment? (Time off work, childcare, funding, transportation, pending prison time, living situation)     None    Dimension VI Ratings   Recovery environment - The placing authority must use the criteria in Dimension VI to determine a client s recovery environment.   RISK DESCRIPTIONS - Severity ratin Client is engaged in structured, meaningful activity, but peers, family, significant other, and living environment are unsupportive, or there is criminal justice involvement by the client or among the client s peers, significant others, or in the client s living environment.    REASONS SEVERITY WAS ASSIGNED - (What support does the person have for making changes? What structure/stability does the person have in his or her daily life that will increase the likelihood that changes can be sustained? What problems exist in the person s environment that will jeopardize getting/staying clean and sober?)     Patient reports that he is currently living between his mother's house and a friend's  house. Pt is currently employed full time for the same company for the past 12 years.  He states his employer and co-workers are supportive of him getting sober. Pt reports fracturing relationships with family and friends due to his use. He stated his girlfriend, of 11 years, is an IV heroin user and is currently in nursing home. His two children live with their maternal grandmother due to pt's and his girlfriend's drug use. Pt lacks a current sober support network. Pt reports that he is on probation in [facility] for a DWI and on probation in [facility] for a domestic assault charge.          Client Choice/Exceptions   Would you like services specific to language, age, gender, culture, Evangelical preference, race, ethnicity, sexual orientation or disability?  No    What particular treatment choices and options would you like to have? residential    Do you have a preference for a particular treatment program? [facility]    Criteria for Diagnosis     Criteria for Diagnosis  DSM-5 Criteria for Substance Use Disorder  Instructions: Determine whether the client currently meets the criteria for Substance Use Disorder using the diagnostic criteria in the DSM-V pp.481-58. Current means during the most recent 12 months outside a facility that controls access to substances    Category of Substance Severity (ICD-10 Code / DSM 5 Code)     Alcohol Use Disorder NA   Cannabis Use Disorder NA   Hallucinogen Use Disorder NA   Inhalant Use Disorder NA   Opioid Use Disorder Severe   (F11.20) (304.00)   Sedative, Hypnotic, or Anxiolytic Use Disorder NA   Stimulant Related Disorder Moderate   (F14.20) (304.20) Cocaine   Tobacco Use Disorder Moderate   (F17.200) (305.1)   Other (or unknown) Substance Use Disorder NA       Collateral Contact Summary   Number of contacts made: 3    Contact with referring person:  NA.    If court related records were reviewed, summarize here: NA    Information from collateral contacts supported/largely agreed  with information from the client and associated risk ratings.      Rule 25 Assessment Summary and Plan   's Recommendation    1)  Complete a residential based or similar treatment program, such as [facility].  2)  Abstain from all mood-altering chemicals unless prescribed by a licensed provider.   3)  Attend, at minimum, 2 weekly 12-step support group meetings.     4)  Actively work with a male sponsor on a weekly basis.   5)  Follow all the recommendations of your treatment/medical providers.  6)  Remain law abiding and follow all recommendations of the Courts/PO.      Collateral Contacts     Name:    [facility]   Relationship:    [facility]   Phone Number:       Releases:         The patient's medical record at [facility] was reviewed and the information contained in the medical record supported the patient's account of his chemical use history and chemical use consequences.  Pt completed a CD evaluation with this writer on 3/9/2017.      Collateral Contacts     Name:    Andree Alexandre   Relationship:    [facility]   Phone Number:    [phone] fax Releases:    Yes     5/11/2017: I left a vm requesting a call back.     From the 3/9/2017 CD Eval: Andree stated pt was charged with Gross Misdemeanor DWI and sentenced 3/23/2015. He is on probation until 03/2019. She stated he is required to complete the CD Eval, follow all of the recommendations, and complete a Victim Impact Panel. She stated he was admitted to [facility] program on 1/6/2017 and was discharged on 2/3/2017 due only attending their admission and no other CD programming. Freeland recommended a higher level of care, such as [facility]. She stated on the 2/8/2017 d/c summary, pt called and reported that he had been crushing and snorting percocet's and needed help. Andree is requesting a copy of his CD eval, the CD eval recommendations, verification of a start date, progress notes, his counselor's name  and number, and UA results.       Collateral Contacts     Name:    Sushil Stovall   Relationship:    [facility]   Phone Number:    [phone] fax   Releases:    Yes     5/11/2017: I received a vm from Sushil stating nothing has really changed with pt since his original assessment on 3/9/2017.  He stated pt knows what he needs to do and he needs to get into tx ASAP. Sushil stated he is concerned about pt's usage.    From the 3/9/2017 CD Eval: Sushil stated pt needs inpatient CD treatment due to the extent of his drug use. He stated pt was release from [facility] 10/24/2016 and returned to his case load. He stated over a year ago he worked with pt, who was was doing well, and pt was referred to low contact supervision, however, pt started using, violated his probation, and started working with Sushil again.    ollateral Contacts      A problematic pattern of alcohol/drug use leading to clinically significant impairment or distress, as manifested by at least two of the following, occurring within a 12-month period:    Alcohol/drug is often taken in larger amounts or over a longer period than was intended.  There is a persistent desire or unsuccessful efforts to cut down or control alcohol/drug use  A great deal of time is spent in activities necessary to obtain alcohol, use alcohol, or recover from its effects.  Craving, or a strong desire or urge to use alcohol/drug  Continued alcohol use despite having persistent or recurrent social or interpersonal problems caused or exacerbated by the effects of alcohol/drug.  Important social, occupational, or recreational activities are given up or reduced because of alcohol/drug use.  Recurrent alcohol/drug use in situations in which it is physically hazardous.  Tolerance, as defined by either of the following: A need for markedly increased amounts of alcohol/drug to achieve intoxication or desired effect.  Withdrawal, as manifested by either of the following: The  characteristic withdrawal syndrome for alcohol/drug (refer to Criteria A and B of the criteria set for alcohol/drug withdrawal).      Specify if: In early remission:  After full criteria for alcohol/drug use disorder were previously met, none of the criteria for alcohol/drug use disorder have been met for at least 3 months but for less than 12 months (with the exception that Criterion A4,  Craving or a strong desire or urge to use alcohol/drug  may be met).     In sustained remission:   After full criteria for alcohol use disorder were previously met, non of the criteria for alcohol/drug use disorder have been met at any time during a period of 12 months or longer (with the exception that Criterion A4,  Craving or strong desire or urge to use alcohol/drug  may be met).   Specify if:   This additional specifier is used if the individual is in an environment where access to alcohol is restricted.    Mild: Presence of 2-3 symptoms    Moderate: Presence of 4-5 symptoms    Severe: Presence of 6 or more symptoms

## 2017-05-11 ASSESSMENT — PATIENT HEALTH QUESTIONNAIRE - PHQ9: SUM OF ALL RESPONSES TO PHQ QUESTIONS 1-9: 0

## 2017-05-11 ASSESSMENT — ANXIETY QUESTIONNAIRES: GAD7 TOTAL SCORE: 4
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
## 2017-07-31 NOTE — DISCHARGE INSTRUCTIONS
Behavioral Discharge Planning and Instructions  THANK YOU FOR CHOOSING THE [facility]  3A  [phone]    Summary: You were admitted to [facility] on 3/30/17 for detoxification from Opiate. A medical exam was performed that included lab work. You have met with a  and opted to discharge home awaiting admission to [facility] ([facility]) IDD Program or [facility]. Please call the [facility] and/or [facility] Admissions Specialist each morning at [phone] and [phone] to check on admission status.  Please take care and make your recovery a priority.    [facility]  [address] [place] [address] [phone]    [address] [address] [phone]    Main Diagnosis:  Per Dr. Macario/Miladis  Opioid dependence with withdrawal   Active Problems:  Opioid use disorder, severe, dependence    History of traumatic brain injury     Major Treatments, Procedures and Findings:  You received treatment for Opiate withdrawal.  You have met with a  to develop a treatment plan for discharge.  You have had labs drawn and a copy of those labs will be sent home with you.  Please bring your lab results with you to your primary follow up appointment. Make your recovery a priority!    Symptoms to Report:  If you experience more anxiety, confusion, sleeplessness, deep sadness or thoughts of suicide, notify your treatment team or notify your primary care physician. IF ANY OF THE SYMPTOMS YOU ARE EXPERIENCING ARE A MEDICAL EMERGENCY CALL 911 IMMEDIATELY.     Lifestyle Adjustment: Adjust your lifestyle to get enough sleep, relaxation, exercise and  good nutrition. Continue to develop healthy coping skills to decrease stress and promote a sober living environment. Do not use alcohol, illegal drugs or addictive medications other than what is currently prescribed. AA, NA, and  Sponsor are excellent resources for support.  ELECTRODIAGNOSTIC LABORATORY REPORT    DATE OF VISIT:  2017  LOCATION: Health system    MRN: [id number]  NAME: Mr. Arian Pena  : 1958 (59 year old)    REFERRING PROVIDER: Dr. Shaun Bae  PRIMARY PROVIDER: Rodrick Pacheco Dunn      Primary Provider:   [facility]  [address] [place] [address]  # [phone]    Appointments:  5/5/17 at 11:00am     Resources:     [facility] [phone]  Support Group:  AA/NA and Sponsor/support  Crisis Intervention: 849.929.6914 or 317-388-0042 (TTY: 412.585.6675).  Call anytime for help.  National [place] on Mental Illness (www.[place].katy.org): 920.723.6825 or 938-334-7899.  Alcoholics Anonymous (www.alcoholics-anonymous.org): Check your phone book for your local chapter.  Suicide Awareness Voices of Education (SAVE) (www.save.org): 873-062-CTCI (0887)  National Suicide Prevention Line (www.mentalhealthmn.org): 156-757-SGDL (3264)  Mental Health Consumer/Survivor Network of [place] (www.mhcsn.net): 200.585.5956 or 046-452-8777  Mental Health Association of [place] (www.mentalhealth.org): 171.993.5529 or 833-857-1029   Substance Abuse and Mental Health Services (www.samhsa.gov)  Narcotics Anonymous [place]: www.[facility].org  [facility] Help line  1-953.773.2372   [place] Recovery Connection ([facility])  [facility] connects people seeking recovery to resources that help foster and sustain long-term recovery.  Whether you are seeking resources for treatment, transportation, housing, job training, education, health care or other pathways to recovery, [facility] is a great place to start.  178.149.4266.  Www.[facility].org      -There has been an increase in opioid overdose and deaths recently.  After even a short period of no drug use a person's intolerance level is lowered.  It is not safe to think a person can use the same amount of drugs as they did prior to their period of no drug use.   Returning to your drug using environment and contact with your drug using friends puts you at high risk for relapse.    Www.harmreduction.org        General Medication Instructions:   See your medication sheet(s) for instructions.   Take all medicines as directed.  Make no  DERRICK Pacheco MD   changes unless your doctor suggests them.   Go to all your doctor visits.  Be sure to have all your required lab tests. This way, your medicines can be refilled on time.  Do not use any drugs not prescribed by your provider.  AA/NA and Sponsors are excellent resources for support  Avoid alcohol.      Please Note:  If you have any questions at anytime after you are discharged please call the [facility] detox unit 3AW unit at [phone].    [facility], Behavioral Intake [phone]    Please take this discharge folder with you to all your follow up appointments, it contains your lab results, diagnosis, medication list and discharge recommendations.      THANK YOU FOR CHOOSING THE [facility]

## 2024-03-14 ENCOUNTER — MEDICAL CORRESPONDENCE (OUTPATIENT)
Dept: HEALTH INFORMATION MANAGEMENT | Facility: CLINIC | Age: 44
End: 2024-03-14
Payer: COMMERCIAL

## 2024-03-14 ENCOUNTER — TRANSFERRED RECORDS (OUTPATIENT)
Dept: HEALTH INFORMATION MANAGEMENT | Facility: CLINIC | Age: 44
End: 2024-03-14
Payer: COMMERCIAL

## 2024-03-15 ENCOUNTER — TRANSCRIBE ORDERS (OUTPATIENT)
Dept: OTHER | Age: 44
End: 2024-03-15

## 2024-03-15 DIAGNOSIS — Z87.81 HISTORY OF TRAUMATIC FRACTURE OF HIP: ICD-10-CM

## 2024-03-15 DIAGNOSIS — S62.609A CLOSED DISPLACED FRACTURE OF PHALANX OF FINGER, UNSPECIFIED FINGER, UNSPECIFIED PHALANX, INITIAL ENCOUNTER: ICD-10-CM

## 2024-03-15 DIAGNOSIS — S62.609A FRACTURE OF UNSPECIFIED PHALANX OF UNSPECIFIED FINGER, INITIAL ENCOUNTER FOR CLOSED FRACTURE: ICD-10-CM

## 2024-03-15 DIAGNOSIS — M25.559 HIP PAIN: Primary | ICD-10-CM

## 2024-03-18 ENCOUNTER — TELEPHONE (OUTPATIENT)
Dept: ORTHOPEDICS | Facility: CLINIC | Age: 44
End: 2024-03-18
Payer: COMMERCIAL

## 2024-03-18 NOTE — TELEPHONE ENCOUNTER
[facility] Call Center    Phone Message    May a detailed message be left on voicemail: no     Reason for Call: Other: can care team review and eval for fracture of Hip pain, History of traumatic hip, Fracture of unspecified phalanx of unspecified finger, initial encounter for steven... / Alyse Haro MD [facility] / [facility]bs / no imgs done. Please call Jay RN to coordinate scheduling. Splint was provided but unsure if it was the best fit per RN.      Action Taken: Other: te    Travel Screening: Not Applicable                                                                    Subjective:       Armand Painting is a 71 y.o. male who is an established patient who was referred by Dr Vargas for evaluation of dysuria.      He recently saw PCP with c/o dysuria. UCx at that time showed moderate growth 50-99k Enterobacter. He was also increased to Flomax BID - urgency in AM, nocturia x 2-3, strong stream. Does not appear he was treated with antibiotics. He was given cream (nystatin-triamcinolone) for suspected balantis. He notes a small cut on foreskin. He does have some burning externally after voiding but denies any dysuria.     He also reports problems with ED. He was given Cialis by PCP but may not have worked well. He has not tried Viagra.     PVR (bladder scan) today - 45cc (not true PVR)    Last PSA 5/16 - 0.41    11/18/2019  Now reports intermittent dysuria that has returned. He reports this is more external irritation. The lesion has reappeared on foreskin. He is out of cream from PCP.   Also reports Viagra 100mg is not working well, worked only one time. He did not try it with emptying stomach.     3/3/2020  He is using Lotrisone cream. He reports penile lesion is still present but improving. Concerned about cost of cream.     8/4/2020  Still with mild urinary frequency (again unable to give specimen) and still with penile lesion. Lesion will come and go, returns when does not use Lotrisone. Did go away completely but now returned. Feels that is related to being wet due to not being circumcised.     The following portions of the patient's history were reviewed and updated as appropriate: allergies, current medications, past family history, past medical history, past social history, past surgical history and problem list.    Review of Systems  Constitutional: no fever or chills  ENT: no nasal congestion or sore throat  Respiratory: no cough or shortness of breath  Cardiovascular: no chest pain or palpitations  Gastrointestinal: no nausea or vomiting, tolerating diet  Genitourinary: as per  "HPI  Hematologic/Lymphatic: no easy bruising or lymphadenopathy  Musculoskeletal: no arthralgias or myalgias  Skin: no rashes or lesions  Neurological: no seizures or tremors  Behavioral/Psych: no auditory or visual hallucinations       Objective:    Vitals: Ht 5' 8" (1.727 m)   Wt 121.7 kg (268 lb 6.6 oz)   BMI 40.81 kg/m²     Physical Exam   General: well developed, well nourished in no acute distress  Head: normocephalic, atraumatic  Neck: supple, trachea midline, no obvious enlargement of thyroid  HEENT: EOMI, mucus membranes moist, sclera anicteric, no hearing impairment  Lungs: symmetric expansion, non-labored breathing  Cardiovascular: regular rate and rhythm, normal pulses  Abdomen: soft, non tender, non distended, no palpable masses, no hepatosplenomegaly, no hernias, bladder nonpalpable. No CVA tenderness.  Musculoskeletal: no peripheral edema, normal ROM in bilateral upper and lower extremities  Lymphatics: no cervical or inguinal lymphadenopathy  Skin: no rashes or lesions  Neuro: alert and oriented x 3, no gross deficits  Psych: normal judgment and insight, normal mood/affect and non-anxious  Genitourinary:   Penis - uncircumcised penis without plaques or scarring. SMALL ROUND,FLAT LESION ON FORESKIN (R dorsal) (APPOX 1CM) LOVETT, WELL HEALING Urethra - orthotopic location without stenosis.  Scrotum  - no lesions or rashes, no hydrocele or hernia.  Testes - descended bilaterally, symmetric without masses, non tender.  Epididymides - no cysts or lesions, no spermatocele, symmetric   JASEN: patient declined exam      Lab Review   Urine analysis today in clinic shows - no urine      Lab Results   Component Value Date    WBC 7.49 11/08/2019    HGB 11.9 (L) 11/08/2019    HCT 40.5 11/08/2019    MCV 86 11/08/2019     11/08/2019     Lab Results   Component Value Date    CREATININE 1.2 07/09/2020    BUN 13 07/09/2020     Lab Results   Component Value Date    PSA 0.41 05/23/2016     No results found for: " PSADIAG    Imaging  Reports and images were personally reviewed by me and discussed with the patient today         Assessment/Plan:      1. Nocturia    - Less bother with Flomax BID, continue   - Last PSA years ago but very low. Likely okay to hold on repeat check.      2. Dysuria    - Intermittent dysuria has returned   - Consider cystoscopy - seems to be more external     3. Penile lesion    - Lotrisone cream refilled   - Unsure etiology    - Discussed excisional biopsy thoroughly - he declines this. Discussed possible circumcision - he declines this.      4. Erectile dysfunction due to arterial insufficiency    - Cialis did not work well   - Viagra 100mg on empty stomach (to [facility]) - not helpful. Again recommend empty stomach.    - Briefly discussed KESHIA or [facility].        Follow up in 6 months

## 2024-03-19 ENCOUNTER — TELEPHONE (OUTPATIENT)
Dept: ORTHOPEDICS | Facility: CLINIC | Age: 44
End: 2024-03-19
Payer: COMMERCIAL

## 2024-03-19 NOTE — TELEPHONE ENCOUNTER
Left Voicemail (1st Attempt) for the patient to call back and schedule the following:    Appointment type: New  Provider: PRADEEP sports  Return date: Next available

## 2024-03-21 ENCOUNTER — TELEPHONE (OUTPATIENT)
Dept: ORTHOPEDICS | Facility: CLINIC | Age: 44
End: 2024-03-21
Payer: COMMERCIAL

## 2024-03-21 DIAGNOSIS — M79.644 FINGER PAIN, RIGHT: Primary | ICD-10-CM

## 2024-03-21 NOTE — TELEPHONE ENCOUNTER
Patient Contacted for the patient to call back and schedule the following:    Appointment type: New  Provider: OLESYA Cárdenas   Return date: 3/25

## 2024-03-21 NOTE — TELEPHONE ENCOUNTER
DIAGNOSIS: right pinky fracture     APPOINTMENT DATE: 3.22.24   NOTES STATUS DETAILS   OFFICE NOTE from referring provider Received 3.14.24  Estrellita  [facility]   MEDICATION LIST Received    XRAYS (IMAGES & REPORTS) In process *sched* for 3.22.24  XR Finger Right

## 2024-03-21 NOTE — TELEPHONE ENCOUNTER
Patient Contacted for the patient to call back and schedule the following:    Appointment type: New  Provider: OLESYA Cárdenas  Return date: 3/22

## 2024-03-22 ENCOUNTER — PRE VISIT (OUTPATIENT)
Dept: ORTHOPEDICS | Facility: CLINIC | Age: 44
End: 2024-03-22

## 2024-03-23 NOTE — TELEPHONE ENCOUNTER
Action 3.24.24 michael   Action Taken Faxed imaging request to [facility].    3.25.24  Imaging resolved to [facility].       DIAGNOSIS: Hip fracture/pain     APPOINTMENT DATE: 3.25.24   NOTES STATUS DETAILS   OFFICE NOTE from referring provider Received 3.14.24  Estrellita  [facility]   OFFICE NOTE from other specialist CE 10.12.22  [facility]   MEDICATION LIST Received    XRAYS (IMAGES & REPORTS) [facility] *sched* for 3.25.24  XR Pelvis and Hip Right    10.12.22  XR Pelvis and Hip Right

## 2024-03-25 ENCOUNTER — PRE VISIT (OUTPATIENT)
Dept: ORTHOPEDICS | Facility: CLINIC | Age: 44
End: 2024-03-25

## 2024-03-25 DIAGNOSIS — M25.551 RIGHT HIP PAIN: Primary | ICD-10-CM

## 2024-07-15 ENCOUNTER — APPOINTMENT (OUTPATIENT)
Dept: CT IMAGING | Facility: CLINIC | Age: 44
End: 2024-07-15
Attending: EMERGENCY MEDICINE
Payer: COMMERCIAL

## 2024-07-15 ENCOUNTER — HOSPITAL ENCOUNTER (EMERGENCY)
Facility: CLINIC | Age: 44
Discharge: HOME | End: 2024-07-15
Attending: EMERGENCY MEDICINE | Admitting: EMERGENCY MEDICINE
Payer: COMMERCIAL

## 2024-07-15 VITALS
TEMPERATURE: 98.8 F | RESPIRATION RATE: 18 BRPM | OXYGEN SATURATION: 98 % | SYSTOLIC BLOOD PRESSURE: 116 MMHG | HEART RATE: 60 BPM | DIASTOLIC BLOOD PRESSURE: 76 MMHG

## 2024-07-15 DIAGNOSIS — R56.9 SEIZURE (H): Primary | ICD-10-CM

## 2024-07-15 DIAGNOSIS — R56.9 SEIZURE-LIKE ACTIVITY (H): ICD-10-CM

## 2024-07-15 LAB
AMPHETAMINES UR QL SCN: NORMAL
ANION GAP SERPL CALCULATED.3IONS-SCNC: 9 MMOL/L (ref 7–15)
ATRIAL RATE - MUSE: 59 BPM
BARBITURATES UR QL SCN: NORMAL
BASOPHILS # BLD AUTO: 0 10E3/UL (ref 0–0.2)
BASOPHILS NFR BLD AUTO: 1 %
BENZODIAZ UR QL SCN: NORMAL
BUN SERPL-MCNC: 12 MG/DL (ref 6–20)
BZE UR QL SCN: NORMAL
CALCIUM SERPL-MCNC: 8.9 MG/DL (ref 8.6–10)
CANNABINOIDS UR QL SCN: NORMAL
CHLORIDE SERPL-SCNC: 105 MMOL/L (ref 98–107)
CREAT SERPL-MCNC: 0.76 MG/DL (ref 0.67–1.17)
DIASTOLIC BLOOD PRESSURE - MUSE: NORMAL MMHG
EGFRCR SERPLBLD CKD-EPI 2021: >90 ML/MIN/1.73M2
EOSINOPHIL # BLD AUTO: 0.1 10E3/UL (ref 0–0.7)
EOSINOPHIL NFR BLD AUTO: 1 %
ERYTHROCYTE [DISTWIDTH] IN BLOOD BY AUTOMATED COUNT: 13.2 % (ref 10–15)
FENTANYL UR QL: NORMAL
GLUCOSE SERPL-MCNC: 94 MG/DL (ref 70–99)
HCO3 SERPL-SCNC: 23 MMOL/L (ref 22–29)
HCT VFR BLD AUTO: 40.3 % (ref 40–53)
HGB BLD-MCNC: 13.7 G/DL (ref 13.3–17.7)
IMM GRANULOCYTES # BLD: 0 10E3/UL
IMM GRANULOCYTES NFR BLD: 0 %
INTERPRETATION ECG - MUSE: NORMAL
LYMPHOCYTES # BLD AUTO: 1.4 10E3/UL (ref 0.8–5.3)
LYMPHOCYTES NFR BLD AUTO: 18 %
MCH RBC QN AUTO: 29.7 PG (ref 26.5–33)
MCHC RBC AUTO-ENTMCNC: 34 G/DL (ref 31.5–36.5)
MCV RBC AUTO: 87 FL (ref 78–100)
MONOCYTES # BLD AUTO: 0.4 10E3/UL (ref 0–1.3)
MONOCYTES NFR BLD AUTO: 5 %
NEUTROPHILS # BLD AUTO: 5.8 10E3/UL (ref 1.6–8.3)
NEUTROPHILS NFR BLD AUTO: 75 %
NRBC # BLD AUTO: 0 10E3/UL
NRBC BLD AUTO-RTO: 0 /100
OPIATES UR QL SCN: NORMAL
P AXIS - MUSE: 44 DEGREES
PCP QUAL URINE (ROCHE): NORMAL
PLATELET # BLD AUTO: 230 10E3/UL (ref 150–450)
POTASSIUM SERPL-SCNC: 4.3 MMOL/L (ref 3.4–5.3)
PR INTERVAL - MUSE: 128 MS
QRS DURATION - MUSE: 84 MS
QT - MUSE: 404 MS
QTC - MUSE: 399 MS
R AXIS - MUSE: -4 DEGREES
RBC # BLD AUTO: 4.62 10E6/UL (ref 4.4–5.9)
SODIUM SERPL-SCNC: 137 MMOL/L (ref 135–145)
SYSTOLIC BLOOD PRESSURE - MUSE: NORMAL MMHG
T AXIS - MUSE: 21 DEGREES
VENTRICULAR RATE- MUSE: 59 BPM
WBC # BLD AUTO: 7.6 10E3/UL (ref 4–11)

## 2024-07-15 PROCEDURE — 93005 ELECTROCARDIOGRAM TRACING: CPT | Mod: RTG

## 2024-07-15 PROCEDURE — 99285 EMERGENCY DEPT VISIT HI MDM: CPT | Performed by: EMERGENCY MEDICINE

## 2024-07-15 PROCEDURE — 99285 EMERGENCY DEPT VISIT HI MDM: CPT | Mod: 25 | Performed by: EMERGENCY MEDICINE

## 2024-07-15 PROCEDURE — 93005 ELECTROCARDIOGRAM TRACING: CPT | Performed by: EMERGENCY MEDICINE

## 2024-07-15 PROCEDURE — 70450 CT HEAD/BRAIN W/O DYE: CPT

## 2024-07-15 PROCEDURE — 80048 BASIC METABOLIC PNL TOTAL CA: CPT | Performed by: EMERGENCY MEDICINE

## 2024-07-15 PROCEDURE — 70450 CT HEAD/BRAIN W/O DYE: CPT | Mod: 26 | Performed by: RADIOLOGY

## 2024-07-15 PROCEDURE — 72125 CT NECK SPINE W/O DYE: CPT | Mod: 26 | Performed by: RADIOLOGY

## 2024-07-15 PROCEDURE — 72125 CT NECK SPINE W/O DYE: CPT

## 2024-07-15 PROCEDURE — 85025 COMPLETE CBC W/AUTO DIFF WBC: CPT | Performed by: EMERGENCY MEDICINE

## 2024-07-15 PROCEDURE — 93010 ELECTROCARDIOGRAM REPORT: CPT | Performed by: EMERGENCY MEDICINE

## 2024-07-15 PROCEDURE — 99222 1ST HOSP IP/OBS MODERATE 55: CPT | Mod: GC | Performed by: STUDENT IN AN ORGANIZED HEALTH CARE EDUCATION/TRAINING PROGRAM

## 2024-07-15 PROCEDURE — 36415 COLL VENOUS BLD VENIPUNCTURE: CPT | Performed by: EMERGENCY MEDICINE

## 2024-07-15 PROCEDURE — 80307 DRUG TEST PRSMV CHEM ANLYZR: CPT | Performed by: EMERGENCY MEDICINE

## 2024-07-15 ASSESSMENT — ACTIVITIES OF DAILY LIVING (ADL)
ADLS_ACUITY_SCORE: 35

## 2024-07-15 ASSESSMENT — COLUMBIA-SUICIDE SEVERITY RATING SCALE - C-SSRS
2. HAVE YOU ACTUALLY HAD ANY THOUGHTS OF KILLING YOURSELF IN THE PAST MONTH?: NO
6. HAVE YOU EVER DONE ANYTHING, STARTED TO DO ANYTHING, OR PREPARED TO DO ANYTHING TO END YOUR LIFE?: NO
1. IN THE PAST MONTH, HAVE YOU WISHED YOU WERE DEAD OR WISHED YOU COULD GO TO SLEEP AND NOT WAKE UP?: NO

## 2024-07-15 NOTE — ED TRIAGE NOTES
Pt arrives w/ c/o seizure like acticity at approximately 1240 today, states it may have last around 5-10 seconds. He states he was talking and then was on the ground twitching. States he has had a similar episode in the past about 20 years ago. Aox4 in triage and VSS.

## 2024-07-15 NOTE — DISCHARGE INSTRUCTIONS
You have been seen in the emergency department today for your symptoms.  Your blood work is normal.  Your head CT does not show any sign of acute problems in the head or mass or bleeding.  Your CT of your neck shows that you have some arthritis in your neck and some disc disease in your neck.  This is not causing any issues for you right now.    You have seen the neurology specialists here in the emergency department and they do suspect that you probably had a first-time seizure.  They are planning to arrange follow-up for you in clinic, as well as an outpatient MRI and EEG (brainwave study).  You should receive phone calls in the next few business days to schedule these appointments.  Please follow-up with these appointments to complete this workup.    You should not drive for at least 90 days after having a suspected seizure per [place] law.

## 2024-07-15 NOTE — ED PROVIDER NOTES
"       [facility]ERGENCY DEPARTMENT ([facility])    7/15/24       ED PROVIDER NOTE     History     Chief Complaint   Patient presents with    seizure-like activity     HPI  Nitin Baker is a 43 year old male who has a hx of TBI who presents to the ED today with concern for possible seizure. Earlier today, around 12:40, he was talking to his friend when he fell to the ground and was told that he was shaking. He was told that he was \"twitching\" on the ground, not certain exactly what part of the body was twitching. This was evidently witnessed by his wife (who is not here right now) and she yelled his name, and he then woke up. He doesn't remember this happening. He felt confused after he woke up. It is not clear how long this episode lasted. Did not bite tongue. No bowel or bladder incontinence. He says he had a similar episode about 20 years ago, was never evaluated. No headache. He was a bit confused after it happened but not now. No double vision or blurry vision. No fevers or chills. No rhinorrhea or sore throat. No cough or SOB.    He hadn't eaten anything since last night when this happened, but did drink some Red Bull. No alcohol. Denies drug use.     He notices chest pain after eating Chinese food or fried chicken. No abdominal pain. No n/v/d. No dysuria or hematuria.    When he fell he hit R shoulder and his head. Last tdap 2016.    Denies prodromal palpitations, chest pain, dizziness or SOB.    Has a hx of OUD, currently on suboxone 8 mg tabs q day. Sees [facility] for care.     No past medical history on file.    No past surgical history on file.    No family history on file.    Social History     Tobacco Use    Smoking status: Every Day     Current packs/day: 0.50     Types: Cigarettes    Smokeless tobacco: Not on file   Substance Use Topics    Alcohol use: Yes         Past Medical History  No past medical history on file.  No past surgical history on file.  gabapentin (NEURONTIN) 300 MG " capsule  traZODone (DESYREL) 50 MG tablet      No Known Allergies  Family History  No family history on file.  Social History   Social History     Tobacco Use    Smoking status: Every Day     Current packs/day: 0.50     Types: Cigarettes   Substance Use Topics    Alcohol use: Yes    Drug use: Yes     Comment: opiates      Past medical history, past surgical history, medications, allergies, family history, and social history were reviewed with the patient. No additional pertinent items.     A complete review of systems was performed with pertinent positives and negatives noted in the HPI, and all other systems negative.    Physical Exam   BP: 132/82  Pulse: 62  Temp: 98.8  F (37.1  C)  Resp: 16  SpO2: 95 %  Physical Exam  Vitals and nursing note reviewed.   Constitutional:       General: He is not in acute distress.     Appearance: He is not diaphoretic.      Comments: Adult male, alert, cooperative, no acute distress   HENT:      Head: Normocephalic.      Comments: Abrasion noted over right forehead     Mouth/Throat:      Mouth: Mucous membranes are moist.      Pharynx: Oropharynx is clear. No oropharyngeal exudate.   Eyes:      General: No scleral icterus.     Pupils: Pupils are equal, round, and reactive to light.   Neck:      Comments: Mild tenderness to palpation over low C-spine  Cardiovascular:      Rate and Rhythm: Normal rate.      Pulses: Normal pulses.      Heart sounds: No murmur heard.  Pulmonary:      Effort: No respiratory distress.      Breath sounds: Normal breath sounds.   Abdominal:      General: Bowel sounds are normal.      Palpations: Abdomen is soft.      Tenderness: There is no abdominal tenderness.   Musculoskeletal:         General: No tenderness.      Comments: Abrasion to right shoulder, normal range of motion of all joints   Skin:     General: Skin is warm.      Findings: No rash.   Neurological:      General: No focal deficit present.      GCS: GCS eye subscore is 4. GCS verbal subscore  is 5. GCS motor subscore is 6.      Cranial Nerves: Cranial nerves 2-12 are intact.      Sensory: Sensation is intact.      Motor: Motor function is intact.      Coordination: Coordination is intact.      Gait: Gait is intact.   Psychiatric:         Mood and Affect: Mood normal.         ED Course, Procedures, & Data      Procedures            EKG Interpretation:      Interpreted by Adelaida Dominguez MD  Time reviewed:1630   Symptoms at time of EKG: None   Rhythm: Sinus bradycardia  Rate: 50-60  Axis: Normal  Ectopy: None  Conduction: Normal  ST Segments/ T Waves: No ST-T wave changes and No acute ischemic changes  Q Waves: None  Comparison to prior: No old EKG available    Clinical Impression: sinus bradycardia            Results for orders placed or performed during the hospital encounter of 07/15/24   CT Cervical Spine w/o Contrast     Status: None    Narrative    EXAM: Cervical spine CT without contrast 7/15/2024 5:09 PM     HISTORY: fall, pain, eval for fx; Neck pain; Trauma; Mild/moderate  trauma; None of the following: Spondyloarthropathy, cervical x-ray  with negative result, questionable finding, or inadequate coverage.     COMPARISON: None.    TECHNIQUE: Using multidetector thin collimation helical acquisition  technique, axial, coronal and sagittal CT images through the cervical  spine were obtained without intravenous contrast.    FINDINGS:  No acute fracture or traumatic subluxation. 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 Severe disc space loss at C4-5 with a posterior disc  osteophyte complex and presumed degenerative prevertebral air  anteriorly, with associated subchondral cystic changes. Opposing  endplate sclerosis at C4-5. Remaining disc spaces are normal. No  aggressive osseous lesion. Normal craniocervical junction.  Straightening of the cervical lordosis may be positional. Cervical  spine vertebral body alignment is normal. Lateral masses of C1 are  appropriately situated on C2. No prevertebral  fluid collection or soft  tissue swelling.    The findings on a level by level basis are as follows:    C2-3: No spinal canal or neural foraminal stenosis.    C3-4: No spinal canal or neural foraminal stenosis.    C4-5: Moderate spinal canal stenosis due to a disc osteophyte complex.  Severe right neural foraminal stenosis due to uncovertebral spurring.  Mild left neural foraminal stenosis.    C5-6: No spinal canal or neural foraminal stenosis.    C6-7: No spinal canal or neural foraminal stenosis.    C7-T1: No spinal canal or neural foraminal stenosis.     No abnormality of the paraspinous soft tissues.      Impression    IMPRESSION:  1. No acute fracture or subluxation in the cervical spine.  2. 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     I have personally reviewed the examination and initial interpretation  and I agree with the findings.    BENJAMIN TAM MD         SYSTEM ID:  [id number]   Head CT w/o contrast     Status: None    Narrative    EXAM: Head CT without contrast 7/15/2024 5:09 PM    HISTORY: first time seizure, trauma/fall, eval for mass or bleed    COMPARISON: Head CT without contrast 4/18/2008.    TECHNIQUE: Helical CT images from the skull base to the vertex were  obtained without the intravenous administration of iodinated contrast  media.     FINDINGS:  No intracranial hemorrhage, mass effect, midline shift, or extra-axial  fluid collection. Gray/white matter differentiation in both cerebral  hemispheres is normal. Ventricles are proportionate to the cerebral  sulci. Basal cisterns are clear.    Atraumatic calvarium and skull base. Prior left nasal bone fracture  partially visualized. Orbits and globes are normal. Visualized  portions of the paranasal sinuses are clear. Mastoid air cells are  clear. No soft tissue swelling.       Impression    IMPRESSION:  No acute intracranial pathology.     I have personally reviewed  the examination and initial interpretation  and I agree with the findings.    BENJAMIN TAM MD         SYSTEM ID:  [id number]   Basic metabolic panel     Status: Normal   Result Value Ref Range    Sodium 137 135 - 145 mmol/L    Potassium 4.3 3.4 - 5.3 mmol/L    Chloride 105 98 - 107 mmol/L    Carbon Dioxide (CO2) 23 22 - 29 mmol/L    Anion Gap 9 7 - 15 mmol/L    Urea Nitrogen 12.0 6.0 - 20.0 mg/dL    Creatinine 0.76 0.67 - 1.17 mg/dL    GFR Estimate >90 >60 mL/min/1.73m2    Calcium 8.9 8.6 - 10.0 mg/dL    Glucose 94 70 - 99 mg/dL   CBC with platelets and differential     Status: None   Result Value Ref Range    WBC Count 7.6 4.0 - 11.0 10e3/uL    RBC Count 4.62 4.40 - 5.90 10e6/uL    Hemoglobin 13.7 13.3 - 17.7 g/dL    Hematocrit 40.3 40.0 - 53.0 %    MCV 87 78 - 100 fL    MCH 29.7 26.5 - 33.0 pg    MCHC 34.0 31.5 - 36.5 g/dL    RDW 13.2 10.0 - 15.0 %    Platelet Count 230 150 - 450 10e3/uL    % Neutrophils 75 %    % Lymphocytes 18 %    % Monocytes 5 %    % Eosinophils 1 %    % Basophils 1 %    % Immature Granulocytes 0 %    NRBCs per 100 WBC 0 <1 /100    Absolute Neutrophils 5.8 1.6 - 8.3 10e3/uL    Absolute Lymphocytes 1.4 0.8 - 5.3 10e3/uL    Absolute Monocytes 0.4 0.0 - 1.3 10e3/uL    Absolute Eosinophils 0.1 0.0 - 0.7 10e3/uL    Absolute Basophils 0.0 0.0 - 0.2 10e3/uL    Absolute Immature Granulocytes 0.0 <=0.4 10e3/uL    Absolute NRBCs 0.0 10e3/uL   EKG 12 lead     Status: None (Preliminary result)   Result Value Ref Range    Systolic Blood Pressure  mmHg    Diastolic Blood Pressure  mmHg    Ventricular Rate 59 BPM    Atrial Rate 59 BPM    OR Interval 128 ms    QRS Duration 84 ms     ms    QTc 399 ms    P Axis 44 degrees    R AXIS -4 degrees    T Axis 21 degrees    Interpretation ECG       Sinus bradycardia  Junctional ST depression, probably normal  Borderline ECG     CBC with Platelets & Differential     Status: None    Narrative    The following orders were created for panel order CBC with Platelets &  Differential.  Procedure                               Abnormality         Status                     ---------                               -----------         ------                     CBC with platelets and d...[[id number]]                      Final result                 Please view results for these tests on the individual orders.   Urine Drug Screen     Status: None (In process)    Narrative    The following orders were created for panel order Urine Drug Screen.  Procedure                               Abnormality         Status                     ---------                               -----------         ------                     Urine Drug Screen Panel[[id number]]                          In process                   Please view results for these tests on the individual orders.     Medications - No data to display  Labs Ordered and Resulted from Time of ED Arrival to Time of ED Departure   BASIC METABOLIC PANEL - Normal       Result Value    Sodium 137      Potassium 4.3      Chloride 105      Carbon Dioxide (CO2) 23      Anion Gap 9      Urea Nitrogen 12.0      Creatinine 0.76      GFR Estimate >90      Calcium 8.9      Glucose 94     CBC WITH PLATELETS AND DIFFERENTIAL    WBC Count 7.6      RBC Count 4.62      Hemoglobin 13.7      Hematocrit 40.3      MCV 87      MCH 29.7      MCHC 34.0      RDW 13.2      Platelet Count 230      % Neutrophils 75      % Lymphocytes 18      % Monocytes 5      % Eosinophils 1      % Basophils 1      % Immature Granulocytes 0      NRBCs per 100 WBC 0      Absolute Neutrophils 5.8      Absolute Lymphocytes 1.4      Absolute Monocytes 0.4      Absolute Eosinophils 0.1      Absolute Basophils 0.0      Absolute Immature Granulocytes 0.0      Absolute NRBCs 0.0     URINE DRUG SCREEN PANEL     CT Cervical Spine w/o Contrast   Final Result   IMPRESSION:   1. No acute fracture or subluxation in the cervical spine.   2. 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       I have personally reviewed the examination and initial interpretation   and I agree with the findings.      BENJAMIN TAM MD            SYSTEM ID:  [id number]      Head CT w/o contrast   Final Result   IMPRESSION:  No acute intracranial pathology.       I have personally reviewed the examination and initial interpretation   and I agree with the findings.      BENJAMIN TAM MD            SYSTEM ID:  [id number]      MR Brain w/o & w Contrast    (Results Pending)          Critical care was not performed.     Medical Decision Making  The patient's presentation was of high complexity (an acute health issue posing potential threat to life or bodily function).    The patient's evaluation involved:  ordering and/or review of 3+ test(s) in this encounter (see separate area of note for details)  independent interpretation of testing performed by another health professional (see separate area of note for details)  discussion of management or test interpretation with another health professional (see separate area of note for details)    The patient's management necessitated only low risk treatment.    Assessment & Plan    This is a 43-year-old male with a history of opioid use disorder and traumatic brain injury a couple of decades ago who presents to the emergency department today with complaints of possible seizure-like activity. Based on the history, it is possible that he could have had a first-time seizure. Syncope is possible as well though he did not have any prodromal symptoms. Previous TBI does put him at higher risk for seizures.    He is neurologically intact on my evaluation here in the ED. We did establish IV access and we did draw blood for laboratory analysis. CBC is within normal limits, BMP is also normal, UDS is pending. We did do a head CT which does not show any evidence of acute intracranial pathology. 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    I did speak with the neurology team and they did come and see the patient in the emergency department.  Please see their note for full details.  They do suspect that this likely represents a first-time seizure. They have given him driving restrictions. They are planning to set him up with outpatient follow-up, outpatient EEG and possible outpatient MRI, so this has been ordered by the neurology team. Patient will be discharged at this time and instructed to follow-up.    This part of the medical record was transcribed by Laurel Anguiano, Medical Scribe, from a dictation done by Alberto Duran MD.      I have reviewed the nursing notes. I have reviewed the findings, diagnosis, plan and need for follow up with the patient.    Discharge Medication List as of 7/15/2024  6:24 PM          Final diagnoses:   Seizure-like activity (H) - suspected first time seizure       Adelaida Dominguez MD  [facility] EMERGENCY DEPARTMENT  7/15/2024     Adelaida Dominguez MD  07/15/24 1905

## 2024-07-16 NOTE — CONSULTS
"[facility]  Neurology Consultation    Patient Name:  Nitin Baker  MRN:  [id number]    :  1980  Date of Service:  July 15, 2024  Primary care provider:  Dr. Alyse Haro MD      Neurology consultation service was asked to see Nitin Baker by Dr. Dominguez to evaluate transient loss of consciousness concerning for seizure.    Chief Complaint:  \"I passed out\"    History of Present Illness:   Nitin Baker is a 43 year old right-handed male with history of opioid use disorder on Suboxone, alcohol use disorder in remission, chronic right hip pain who presents after transient loss of consciousness.    Neurology is consulted regarding if this episode was due to seizure.    History is obtained from Nitin, as well as his girlfriend Katie who is present at the bedside.    Per their report, they were at the [facility] in [place]. Nitin was standing in front of their car talking to a friend. That is his last memory prior to the episode.    Per Katie's report, Nitin's head turned to the right and his right arm was reaching toward the ground - she thought he was reaching for something but then he fell. She got out of the car and saw him on the ground, arms and legs rhythmically shaking for 5 seconds while his eyes were closed.    The shaking stopped and Nitin seemed confused by what had just happened. He reports that he continued to have mild confusion. They got back in their car and he was able to drive. He is able to accurately tell me the route that he drove to the hospital. Right now, approximately 4 hours after the event, he feels at baseline.    In terms of seizure risk factors, Nitin suspects that he had at least 1 seizure around 20 years ago, but that was in the setting of significant alcohol and drug use. He did not see a clinician at the time and has never been diagnosed with " epilepsy.    He does report multiple TBIs with loss of consciousness. He denies history of febrile seizures during childhood, meningitis/encephalitis, or intracranial surgery. He denies known family history of seizure.    He reports that over the past few days he has not been feeling sick. He denies alcohol or recreational drug use - has been taking Suboxone as prescribed.    ROS  A comprehensive ROS was performed and pertinent findings were included in HPI.     PMH  No past medical history on file.  No past surgical history on file.    Medications   I have personally reviewed the patient's medication list.     Allergies  I have personally reviewed the patient's allergy list.     Social History  Works for [employer], making potato chips. Denies alcohol and recreational drug use.    Family History    Denies known family history of seizures.      Physical Examination   Vitals: /76   Pulse 60   Temp 98.8  F (37.1  C) (Oral)   Resp 18   SpO2 98%   General: Sitting in chair, no acute distress.  Head: No nuchal rigidity.  Eyes: no icterus, op pink and moist  Cardiac: regular rate on monitor  Respiratory: non-labored on RA. Speaking comfortably in complete sentences.  GI: soft  Skin: No rash or lesion on exposed skin  Psych: Mood pleasant, affect congruent  Neuro:  Mental status: Awake, alert, attentive, oriented to self, time, place, and circumstance. Language is fluent and coherent with intact comprehension of complex commands, naming and repetition.  Cranial nerves: visual fields full on confrontational testing, left pupil ~1 mm larger than right (same difference in light and dark; both pupils briskly reactive), conjugate gaze, EOMI, upper and lower face symmetric at rest and with activation, tongue protrusion midline  Motor: Normal bulk and tone. No abnormal movements. 5/5 strength bilaterally in deltoids, biceps, triceps, hand , hip flexors, knee flexion, knee extension, plantarflexion,  dorsiflexion.   Reflexes: 2/4 and symmetric at bilateral biceps, brachioradialis, patella, and achilles. Toes downgoing bilaterally.  Sensory: Intact to light touch throughout. No extinction on double simultaneous testing.  Coordination: FNF intact bilaterally.  Gait: Normal casual gait. Heel and toe walk intact.    Investigations   I have personally reviewed pertinent labs, tests, and radiological imaging. Discussion of notable findings is included under Impression.     Was patient transferred from outside hospital?   No    Impression  #Unprovoked seizure, first time  Nitin Escobar is a 43 year old right-handed male with history of opioid use disorder on Suboxone and alcohol use disorder in remission who presents after transient loss of consciousness.    Neurology is consulted regarding if this episode represents seizure.    Episode in question did not have aura; semiology with head turn to right and reaching to right, followed by falling to ground and having ~5 seconds of generalized, rhythmic convulsions. Mild confusion afterwards that has now resolved.    Differentials include seizure vs convulsive syncope; no sense of dizziness, lightheadedness, or clouding/tunneling of vision to suggest syncope. By description, shaking movements were rhythmic. Some mild confusion after the episode which has cleared, consistent with a post-ictal state.    Overall, favor that this episode was due to seizure. Although had 1 seizure around 20 years ago, that was likely provoked as it was in the setting of significant alcohol and drug use. Nitin's seizure risk factors include multiple TBIs. By history, no clear provoking factor for the episode today.    Therefore, would treat as first time unprovoked seizure - would not start anti-seizure medication. Outpatient MRI and EEG. Epilepsy clinic follow up. Discussed this plan with Nitin, and he is in agreement.    Also counseled Nitin regarding seizure precautions;  generally avoiding situations where he or someone else could be hurt or killed if he were to seize, such as operating heavy machinery, climbing on ladders, swimming without someone else watching him, etc. Also reviewed with him [place] State Law that prohibits driving for 3 months after seizure with loss of consciousness (in this case, is prohibited from driving until 10/15/24). Nitin expressed understanding and agreement.    Recommendations  - agree with CT Head and C spine; if pathologic findings, please page neurology resident on call  - no anti-seizure medication at this time  - outpatient MRI Brain w/ and w/o contrast (ordered for you in discharge navigator)  - outpatient EEG (ordered for you in discharge navigator)  - epilepsy clinic referral (ordered for you in discharge navigator)    Neurology will sign off at this time. Presuming no acute findings on CT imaging, reasonable to discharge from neurology perspective - if there are acute findings, please page neurology resident on call for further recs.    Thank you for involving Neurology in the care of Nitin Baker.  Please do not hesitate to call with questions/concerns (consult pager [phone]).      Patient was seen and discussed with Dr. Joseph.    Tyrese Sharp MD  Neurology Resident PGY-4

## 2025-06-27 ENCOUNTER — HOSPITAL ENCOUNTER (EMERGENCY)
Facility: CLINIC | Age: 45
Discharge: HOME | End: 2025-06-28
Attending: EMERGENCY MEDICINE | Admitting: EMERGENCY MEDICINE
Payer: COMMERCIAL

## 2025-06-27 DIAGNOSIS — K04.7 PERIAPICAL ABSCESS: ICD-10-CM

## 2025-06-27 DIAGNOSIS — K08.89 PAIN, DENTAL: ICD-10-CM

## 2025-06-27 PROCEDURE — 41800 DRAINAGE OF GUM LESION: CPT | Performed by: EMERGENCY MEDICINE

## 2025-06-27 PROCEDURE — 99284 EMERGENCY DEPT VISIT MOD MDM: CPT | Mod: 25 | Performed by: EMERGENCY MEDICINE

## 2025-06-27 PROCEDURE — 76536 US EXAM OF HEAD AND NECK: CPT | Mod: 59 | Performed by: EMERGENCY MEDICINE

## 2025-06-27 PROCEDURE — 76536 US EXAM OF HEAD AND NECK: CPT | Mod: 26 | Performed by: EMERGENCY MEDICINE

## 2025-06-27 ASSESSMENT — COLUMBIA-SUICIDE SEVERITY RATING SCALE - C-SSRS
1. IN THE PAST MONTH, HAVE YOU WISHED YOU WERE DEAD OR WISHED YOU COULD GO TO SLEEP AND NOT WAKE UP?: NO
2. HAVE YOU ACTUALLY HAD ANY THOUGHTS OF KILLING YOURSELF IN THE PAST MONTH?: NO
6. HAVE YOU EVER DONE ANYTHING, STARTED TO DO ANYTHING, OR PREPARED TO DO ANYTHING TO END YOUR LIFE?: NO

## 2025-06-28 VITALS
SYSTOLIC BLOOD PRESSURE: 141 MMHG | DIASTOLIC BLOOD PRESSURE: 88 MMHG | TEMPERATURE: 98.6 F | HEART RATE: 68 BPM | OXYGEN SATURATION: 99 % | RESPIRATION RATE: 18 BRPM

## 2025-06-28 PROCEDURE — 250N000013 HC RX MED GY IP 250 OP 250 PS 637: Performed by: EMERGENCY MEDICINE

## 2025-06-28 RX ORDER — ACETAMINOPHEN 500 MG
1000 TABLET ORAL ONCE
Status: COMPLETED | OUTPATIENT
Start: 2025-06-28 | End: 2025-06-28

## 2025-06-28 RX ORDER — NAPROXEN 500 MG/1
500 TABLET ORAL 2 TIMES DAILY PRN
Qty: 30 TABLET | Refills: 0 | Status: SHIPPED | OUTPATIENT
Start: 2025-06-28

## 2025-06-28 RX ORDER — IBUPROFEN 600 MG/1
600 TABLET, FILM COATED ORAL ONCE
Status: COMPLETED | OUTPATIENT
Start: 2025-06-28 | End: 2025-06-28

## 2025-06-28 RX ADMIN — ACETAMINOPHEN 1000 MG: 500 TABLET ORAL at 00:19

## 2025-06-28 RX ADMIN — AMOXICILLIN AND CLAVULANATE POTASSIUM 1 TABLET: 875; 125 TABLET, COATED ORAL at 02:22

## 2025-06-28 RX ADMIN — IBUPROFEN 600 MG: 600 TABLET ORAL at 00:20

## 2025-06-28 ASSESSMENT — ACTIVITIES OF DAILY LIVING (ADL)
ADLS_ACUITY_SCORE: 41
ADLS_ACUITY_SCORE: 41

## 2025-06-28 NOTE — ED TRIAGE NOTES
Patient presents with swollen gum on the right side of his face. States he noticed it a couple of days and increased in size. States no pain . States he is concerned for an abscess and a tooth decay. Patient is visibly swollen on the right side of his face. Denies Allergies and respiratory symptoms.

## 2025-06-28 NOTE — DISCHARGE INSTRUCTIONS
Instructions from your doctor today:  Emergency Department (ED) testing is focused on the potential causes of your symptoms that are the most dangerous possibilities, and cannot cover every possibility. Based on the evaluation, it was deemed sufficiently safe to discharge and continue management through the clinics. Thus, follow-up is very important to assess for improvement/worsening, potential further testing, and potential treatment adjustments. If you were given opioid pain medications or other medications that can make you drowsy while in the ED, you should not drive for at least several hours and not until you feel completely back to normal.     Please make an appointment to follow up with:  - Dental - [facility] (phone: [phone]) or another community dentist as soon as possible  - If you do not have a primary care provider, you can be seen in follow-up and establish care by calling any of the clinics below:     - [facility] (phone: [phone])     - [facility] / [facility] (phone: [phone])   - Have your clinic provider review the results from today's visit with you again, including any potential follow-up or additional testing that may be needed based on the results. Occasionally, incidental findings are found on later review by radiologists that may need follow-up.     Return to the Emergency Department immediately if you have worsening symptoms, fever (temperature > 100.4 F), or any other urgent health concerns.          Many of these clinics offer a sliding fee option for patients that qualify, and see patients on a walk-in or same day basis. Please call each clinic directly. As services, hours, fees and policies vary greatly.    [place]:  [facility]     [phone]  [facility] [phone]  [facility]  [phone]  [facility]      [phone]  [facility]  [facility]    [phone]  [facility]  [phone]  [facility] (formerly [facility]) [phone]  [facility]     [phone]  [facility]   [phone]  [facility] (cash only)   [phone]  [facility]    [phone] (adults)          [phone] (children)    [place]:  [facility]     [phone]; [phone]  [place] [facility]     [phone]  [facility]     [phone]  [facility] (free, limited)    [phone]    Multiple Locations:  [facility]       [phone]

## 2025-06-28 NOTE — ED PROVIDER NOTES
History     Chief Complaint   Patient presents with    Oral Swelling     HPI  Nitin Baker is a 44 year old male with PMH notable for TBI, hip fracture, opioid use disorder on Suboxone maintenance who presents to the ED with dental pain. Symptoms started about 2 days ago.  Patient using ibuprofen for the pain, most recently took it about 6 hours ago.  Patient now noticing swelling on the right side of his face.  Patient points to right side upper teeth as location.  No fevers.     Physical Exam   BP: (!) 141/88  Pulse: 68  Temp: 98.6  F (37  C)  Resp: 18  SpO2: 99 %    Physical Exam  General: No acute distress. Appears stated age.   HENT: MMM, no oropharyngeal lesions. No trismus. No sublingual firmness. Likely palpable drainable abscess present. Fullness and tenderness at base of tooth #5. Overall poor dentition with many advanced caries.   Eyes: PERRL, normal sclerae   Cardio: Regular rate, extremities well perfused  Resp: Normal work of breathing, normal respiratory rate  Neuro: alert and fully oriented. CN II-XII grossly intact. Grossly normal strength and sensation in all extremities.   MSK: no deformities.   Integumentary/Skin: no rash, normal color  Psych: normal affect, normal behavior    ED Course      [facility]    PROCEDURE: -Incision/Drainage    Date/Time: 6/28/2025 2:00 AM    Performed by: Ta Hoover MD  Authorized by: Ta Hoover MD    Risks, benefits and alternatives discussed.      LOCATION:      Type:  Abscess    Location:  Mouth    Mouth location: Apical at base of tooth #5.    PROCEDURE TYPE:     Complexity:  Simple    ANESTHESIA (see MAR for exact dosages):     Anesthesia method:  Nerve block    Block location:  Infraorbital    Block needle gauge:  27 G    Block anesthetic:  Bupivacaine 0.5% WITH epi    Block injection procedure:  Anatomic landmarks identified, introduced needle, incremental injection, anatomic  landmarks palpated and negative aspiration for blood    Block outcome:  Anesthesia achieved    PROCEDURE DETAILS:     Incision types:  Single straight    Incision depth:  Subcutaneous    Scalpel blade:  11    Drainage:  Purulent    Drainage amount:  Moderate    Packing materials:  None    PROCEDURE    Patient Tolerance:  Patient tolerated the procedure well with no immediate complications    Infraorbital nerve block  Procedure performed by: Dr. Ta Hoover MD  Discussed the indications, alternatives, risks, and benefits of the procedure with the patient.  After demonstrating understanding and capacity, the patient gave verbal consent.   Landmarks identified   27 g needle used, negative aspiration for blood  2.5 ml 0.5% bupivacaine with epinephrine injected  Pain significantly improved  Procedure tolerated well with no immediate complications.   Results for orders placed during the hospital encounter of 06/27/25    POC US SOFT TISSUE    Impression  Limited Bedside ED Ultrasound of Soft Tissue:  PROCEDURE: PERFORMED BY: Dr. Ta Hoover MD  INDICATIONS/SYMPTOM: Skin redness, evaluate for abscess, cellulitis or foreign body  PROBE: High frequency linear probe  BODY LOCATION: Soft tissue located on face  FINDINGS: complex fluid collection surrounding root of tooth #5  INTERPRETATION:  The soft tissue and muscle layers were evaluated.  Findings indicate abscess  IMAGE DOCUMENTATION: Images were archived to PACs system.       Critical Care time:  none       Labs Ordered and Resulted from Time of ED Arrival to Time of ED Departure - No data to display  POC US SOFT TISSUE   Final Result   Limited Bedside ED Ultrasound of Soft Tissue:   PROCEDURE: PERFORMED BY: Dr. Ta Hoover MD   INDICATIONS/SYMPTOM: Skin redness, evaluate for abscess, cellulitis or foreign body   PROBE: High frequency linear probe   BODY LOCATION: Soft tissue located on face   FINDINGS: complex fluid collection surrounding root of  tooth #5   INTERPRETATION:  The soft tissue and muscle layers were evaluated.       Findings indicate abscess   IMAGE DOCUMENTATION: Images were archived to PACs system.                   Medical Decision Making  The patient's presentation was of moderate complexity (an undiagnosed new problem with uncertain prognosis).    The patient's evaluation involved:  ordering and/or review of 1 test(s) in this encounter (see separate area of note for details)    The patient's management necessitated moderate risk (prescription drug management including medications given in the ED) and moderate risk (a decision regarding minor procedure (incision & drainage) with risk factors of none).        Assessments & Plan    Patient presenting with dental pain. Vitals in the ED unremarkable. Nursing notes reviewed. Initial differential diagnosis includes but not limited to periapical abscess, Bruno angina, dental fracture, caries, submandibular abscess.     No sublingual firmness to suggest Bruno angina or other deep mouth space infection. No trismus. There is palpable likely drainable abscess. US confirmed abscess presence at base of tooth #5. R infraorbital nerve block performed as detailed above with significant improvement in pain. I&D then performed as detailed above without complication, had purulent output.     The complete clinical picture is most consistent with dental abscss. After counseling on the diagnosis, work-up, and treatment plan, the patient was discharged to home. The patient was advised to follow-up with immediate care dental clinic or another community dentist as soon as able. The patient was advised to return to the ED if worsening symptoms, swelling under the tongue or jaw, fever, or if there are any urgent health concerns.     Final diagnoses:   Periapical abscess   Pain, dental     New Prescriptions    AMOXICILLIN-CLAVULANATE (AUGMENTIN) 875-125 MG TABLET    Take 1 tablet by mouth 2 times daily.    BENZOCAINE  (ORAJEL MAXIMUM STRENGTH) 20 % GEL GEL    Take by mouth 4 times daily as needed (dental pain).    NAPROXEN (NAPROSYN) 500 MG TABLET    Take 1 tablet (500 mg) by mouth 2 times daily as needed for moderate pain. Take with meals.       --  Ta Hoover MD   Emergency Medicine   [facility] EMERGENCY DEPARTMENT  6/27/2025       Ta Hoover MD  06/28/25 0229